# Patient Record
Sex: FEMALE | Race: WHITE | NOT HISPANIC OR LATINO | Employment: FULL TIME | URBAN - METROPOLITAN AREA
[De-identification: names, ages, dates, MRNs, and addresses within clinical notes are randomized per-mention and may not be internally consistent; named-entity substitution may affect disease eponyms.]

---

## 2023-04-22 PROBLEM — R53.83 FATIGUE: Status: ACTIVE | Noted: 2023-04-22

## 2023-04-22 PROBLEM — S39.012A LUMBAR STRAIN: Status: ACTIVE | Noted: 2023-04-22

## 2023-04-22 PROBLEM — E55.9 VITAMIN D DEFICIENCY: Status: ACTIVE | Noted: 2023-04-22

## 2023-04-22 PROBLEM — R10.9 ABDOMINAL PAIN: Status: RESOLVED | Noted: 2023-04-22 | Resolved: 2023-04-22

## 2023-04-22 PROBLEM — L73.9 FOLLICULITIS: Status: ACTIVE | Noted: 2023-04-22

## 2023-04-22 PROBLEM — A49.8 GARDNERELLA INFECTION: Status: ACTIVE | Noted: 2023-04-22

## 2023-04-22 PROBLEM — M54.50 LOW BACK PAIN: Status: ACTIVE | Noted: 2023-04-22

## 2023-04-22 PROBLEM — N89.8 VAGINAL DISCHARGE: Status: RESOLVED | Noted: 2023-04-22 | Resolved: 2023-04-22

## 2023-04-22 PROBLEM — N63.0 BREAST NODULE: Status: ACTIVE | Noted: 2023-04-22

## 2023-04-22 PROBLEM — R21 RASH: Status: RESOLVED | Noted: 2023-04-22 | Resolved: 2023-04-22

## 2023-04-22 PROBLEM — R10.2 PELVIC PAIN IN FEMALE: Status: ACTIVE | Noted: 2023-04-22

## 2023-04-22 PROBLEM — M43.00 PARS DEFECT: Status: ACTIVE | Noted: 2023-04-22

## 2023-05-26 ENCOUNTER — PATIENT OUTREACH (OUTPATIENT)
Dept: PRIMARY CARE | Facility: CLINIC | Age: 23
End: 2023-05-26
Payer: COMMERCIAL

## 2023-05-26 RX ORDER — IBUPROFEN 600 MG/1
600 TABLET ORAL EVERY 6 HOURS PRN
COMMUNITY
End: 2023-06-01 | Stop reason: ALTCHOICE

## 2023-05-26 RX ORDER — ACETAMINOPHEN 325 MG/1
TABLET ORAL EVERY 4 HOURS PRN
COMMUNITY
End: 2023-06-01 | Stop reason: ALTCHOICE

## 2023-05-26 NOTE — PROGRESS NOTES
Discharge Facility: [facility]  Discharge Diagnosis: Hypokalemia, Non-ST elevation myocardial infarction (NSTEMI), subendocardial infarction, initial episode of care, Tobacco abuse, amphetamine use, marijuana use  Admission Date: 5/25/2023  Discharge Date: 5/25/2023    PCP Appointment Date: 6/1/2023  Specialist Appointment Date: TBD  Hospital Encounter and Summary: Linked   See discharge assessment below for further details  Engagement  Call Start Time: 1534 (5/26/2023  3:38 PM)    Medications  Medications reviewed with patient/caregiver?: Yes (5/26/2023  3:38 PM)  Is the patient having any side effects they believe may be caused by any medication additions or changes?: No (5/26/2023  3:38 PM)  Does the patient have all medications ordered at discharge?: Yes (5/26/2023  3:38 PM)  Prescription Comments: see med list (5/26/2023  3:38 PM)  Is the patient taking all medications as directed (includes completed medication regime)?: Yes (5/26/2023  3:38 PM)    Appointments  Does the patient have a primary care provider?: Yes (5/26/2023  3:38 PM)  Care Management Interventions: Verified appointment date/time/provider (5/26/2023  3:38 PM)  Has the patient kept scheduled appointments due by today?: Yes (5/26/2023  3:38 PM)  Care Management Interventions: Advised patient to keep appointment (5/26/2023  3:38 PM)    Self Management  What is the home health agency?: denies need (5/26/2023  3:38 PM)    Patient Teaching  Does the patient have access to their discharge instructions?: Yes (5/26/2023  3:38 PM)  Care Management Interventions: Reviewed instructions with patient (5/26/2023  3:38 PM)  What is the patient's perception of their health status since discharge?: Improving (5/26/2023  3:38 PM)  Is the patient/caregiver able to teach back the hierarchy of who to call/visit for symptoms/problems? PCP, Specialist, Home Health nurse, Urgent Care, ED, 911: Yes (5/26/2023  3:38 PM)  If the patient is a current smoker,  are they able to teach back resources for cessation?: [phone] (5/26/2023  3:38 PM)

## 2023-06-01 ENCOUNTER — OFFICE VISIT (OUTPATIENT)
Dept: PRIMARY CARE | Facility: CLINIC | Age: 23
End: 2023-06-01
Payer: COMMERCIAL

## 2023-06-01 VITALS
TEMPERATURE: 97.6 F | OXYGEN SATURATION: 98 % | DIASTOLIC BLOOD PRESSURE: 82 MMHG | HEART RATE: 92 BPM | WEIGHT: 186.4 LBS | BODY MASS INDEX: 35.19 KG/M2 | HEIGHT: 61 IN | SYSTOLIC BLOOD PRESSURE: 110 MMHG

## 2023-06-01 DIAGNOSIS — M54.2 NECK PAIN: ICD-10-CM

## 2023-06-01 DIAGNOSIS — I47.10 SVT (SUPRAVENTRICULAR TACHYCARDIA) (CMS-HCC): ICD-10-CM

## 2023-06-01 DIAGNOSIS — F33.1 MODERATE EPISODE OF RECURRENT MAJOR DEPRESSIVE DISORDER (MULTI): Primary | ICD-10-CM

## 2023-06-01 DIAGNOSIS — R53.82 CHRONIC FATIGUE: ICD-10-CM

## 2023-06-01 DIAGNOSIS — E55.9 VITAMIN D DEFICIENCY: ICD-10-CM

## 2023-06-01 PROBLEM — M79.89 SOFT TISSUE MASS: Status: ACTIVE | Noted: 2019-08-26

## 2023-06-01 PROBLEM — B35.4 TINEA CORPORIS: Status: ACTIVE | Noted: 2020-06-05

## 2023-06-01 PROBLEM — L02.429 FURUNCLE OF AXILLA: Status: ACTIVE | Noted: 2019-08-26

## 2023-06-01 PROBLEM — B37.9 CANDIDIASIS: Status: ACTIVE | Noted: 2020-01-23

## 2023-06-01 PROBLEM — B37.2 CANDIDAL DERMATITIS: Status: ACTIVE | Noted: 2019-08-27

## 2023-06-01 PROCEDURE — 99215 OFFICE O/P EST HI 40 MIN: CPT | Performed by: FAMILY MEDICINE

## 2023-06-01 PROCEDURE — 1036F TOBACCO NON-USER: CPT | Performed by: FAMILY MEDICINE

## 2023-06-01 PROCEDURE — 3008F BODY MASS INDEX DOCD: CPT | Performed by: FAMILY MEDICINE

## 2023-06-01 NOTE — PROGRESS NOTES
"Patient: Rosa Rizo  : 2000  PCP: Preston Madrigal MD  MRN: [id number]  Program: No linked episodes     Rosa Rizo is a 22 y.o. female presenting today for follow-up after being discharged from the hospital 7 days ago. The main problem requiring admission was heart issues. The discharge summary and/or Transitional Care Management documentation was reviewed. Medication reconciliation was performed as indicated via the \"Daniel as Reviewed\" timestamp.     Rosa Rizo was contacted by Transitional Care Management services two days after her discharge. This encounter and supporting documentation was reviewed.    The complexity of medical decision making for this patient's transitional care is high.      Discharge Facility: [facility]  Discharge Diagnosis: Hypokalemia, Non-ST elevation myocardial infarction (NSTEMI), subendocardial infarction, initial episode of care, Tobacco abuse, amphetamine use, marijuana use  Admission Date: 2023  Discharge Date: 2023    PCP Appointment Date: 2023  Specialist Appointment Date: TBD  Hospital Encounter and Summary: Linked   See discharge assessment below for further details  Engagement  Call Start Time: 1534 (2023  3:38 PM)    Medications  Medications reviewed with patient/caregiver?: Yes (2023  3:38 PM)  Is the patient having any side effects they believe may be caused by any medication additions or changes?: No (2023  3:38 PM)  Does the patient have all medications ordered at discharge?: Yes (2023  3:38 PM)  Prescription Comments: see med list (2023  3:38 PM)  Is the patient taking all medications as directed (includes completed medication regime)?: Yes (2023  3:38 PM)    Appointments  Does the patient have a primary care provider?: Yes (2023  3:38 PM)  Care Management Interventions: Verified appointment date/time/provider (2023  3:38 PM)  Has the patient kept scheduled " appointments due by today?: Yes (5/26/2023  3:38 PM)  Care Management Interventions: Advised patient to keep appointment (5/26/2023  3:38 PM)    Self Management  What is the home health agency?: denies need (5/26/2023  3:38 PM)    Patient Teaching  Does the patient have access to their discharge instructions?: Yes (5/26/2023  3:38 PM)  Care Management Interventions: Reviewed instructions with patient (5/26/2023  3:38 PM)  What is the patient's perception of their health status since discharge?: Improving (5/26/2023  3:38 PM)  Is the patient/caregiver able to teach back the hierarchy of who to call/visit for symptoms/problems? PCP, Specialist, Home Health nurse, Urgent Care, ED, 911: Yes (5/26/2023  3:38 PM)  If the patient is a current smoker, are they able to teach back resources for cessation?: [phone] (5/26/2023  3:38 PM)        Has not felt right since getting out of hospital, feels like heart is racing.     Complains of right shoulder into neck pain, complains of aching. Pain travels into right arm.     HPI  Pain in the neck  No trauma or injury  She would like testing    Overdosed on meds she thought was ecstasy but might have been laced with cocaine or other drugs  Was in ER  Fortunately she survived and we discussed at length the dread complications of illicit drug use  Had SVT  Keep cardio evaluation    Low vitamin D  Replace    Chronic fatigue  Stable    Depression stable  No suicidal ideation no psychotic or manic symptoms  Recommend counseling    Consider substance abuse counseling and she declines at this time she states this is not a problem for her  Review of Systems   Constitutional:  Negative for activity change and fatigue.   HENT:  Negative for congestion and sore throat.    Eyes:  Negative for discharge.   Respiratory:  Negative for cough, chest tightness and shortness of breath.    Cardiovascular:  Negative for chest pain and leg swelling.   Gastrointestinal:  Negative for abdominal pain,  "blood in stool, constipation, diarrhea, nausea and vomiting.   Endocrine: Negative for cold intolerance and heat intolerance.   Genitourinary:  Negative for difficulty urinating and hematuria.   Musculoskeletal:  Negative for arthralgias, back pain, gait problem, myalgias and neck pain.   Allergic/Immunologic: Negative for environmental allergies.   Neurological:  Negative for dizziness, syncope, weakness, numbness and headaches.   Hematological:  Negative for adenopathy. Does not bruise/bleed easily.   Psychiatric/Behavioral:  Negative for dysphoric mood. The patient is not nervous/anxious.    All other systems reviewed and are negative.      Objective   /82 (BP Location: Right arm, BP Cuff Size: Large adult)   Pulse 92   Temp 36.4 °C (97.6 °F)   Ht 1.549 m (5' 1\")   Wt 84.6 kg (186 lb 6.4 oz)   SpO2 98%   BMI 35.22 kg/m²    Physical Exam  Vitals and nursing note reviewed.   Constitutional:       General: She is not in acute distress.     Appearance: Normal appearance.   HENT:      Head: Normocephalic and atraumatic.      Right Ear: Tympanic membrane, ear canal and external ear normal.      Left Ear: Tympanic membrane, ear canal and external ear normal.      Nose: Nose normal.      Mouth/Throat:      Mouth: Mucous membranes are moist.      Pharynx: Oropharynx is clear. No oropharyngeal exudate or posterior oropharyngeal erythema.   Eyes:      Extraocular Movements: Extraocular movements intact.      Conjunctiva/sclera: Conjunctivae normal.      Pupils: Pupils are equal, round, and reactive to light.   Cardiovascular:      Rate and Rhythm: Normal rate and regular rhythm.      Pulses: Normal pulses.      Heart sounds: Normal heart sounds. No murmur heard.  Pulmonary:      Effort: Pulmonary effort is normal. No respiratory distress.      Breath sounds: Normal breath sounds. No wheezing or rales.   Abdominal:      General: Abdomen is flat. Bowel sounds are normal. There is no distension.      Palpations: " Abdomen is soft. There is no mass.      Tenderness: There is no abdominal tenderness.   Musculoskeletal:         General: No swelling or deformity. Normal range of motion.      Cervical back: Normal range of motion and neck supple.      Right lower leg: No edema.      Left lower leg: No edema.   Lymphadenopathy:      Cervical: No cervical adenopathy.   Skin:     General: Skin is warm and dry.      Capillary Refill: Capillary refill takes less than 2 seconds.      Findings: No lesion or rash.   Neurological:      General: No focal deficit present.      Mental Status: She is alert and oriented to person, place, and time.      Cranial Nerves: No cranial nerve deficit.      Motor: No weakness.   Psychiatric:         Mood and Affect: Mood normal.         Behavior: Behavior normal.         Thought Content: Thought content normal.         Judgment: Judgment normal.         Assessment/Plan   Problem List Items Addressed This Visit          Circulatory    SVT (supraventricular tachycardia) (CMS/HCC)    Relevant Orders    Referral to Cardiology    Follow Up In [facility] - PCP       Endocrine/Metabolic    Vitamin D deficiency       Other    Fatigue    Moderate episode of recurrent major depressive disorder (CMS/HCC) - Primary     Other Visit Diagnoses       Neck pain        Relevant Orders    XR cervical spine complete 4-5 views            Patient education provided.  Stay current with age appropriate health maintenance as instructed.  Appointment here or ER with new or worsening symptoms'  Keep appropriate follow-up visit.  Stay current with proper immunizations   3-month recheck testing as above and stressed importance of avoiding illicit drug use or abuse of prescription drugs

## 2023-06-02 ASSESSMENT — ENCOUNTER SYMPTOMS
ARTHRALGIAS: 0
BACK PAIN: 0
MYALGIAS: 0
DIFFICULTY URINATING: 0
CONSTIPATION: 0
DIARRHEA: 0
VOMITING: 0
SORE THROAT: 0
NUMBNESS: 0
WEAKNESS: 0
ACTIVITY CHANGE: 0
NECK PAIN: 0
BLOOD IN STOOL: 0
FATIGUE: 0
NERVOUS/ANXIOUS: 0
NAUSEA: 0
ADENOPATHY: 0
EYE DISCHARGE: 0
HEADACHES: 0
SHORTNESS OF BREATH: 0
ABDOMINAL PAIN: 0
COUGH: 0
DYSPHORIC MOOD: 0
DIZZINESS: 0
HEMATURIA: 0
CHEST TIGHTNESS: 0
BRUISES/BLEEDS EASILY: 0

## 2023-06-08 ENCOUNTER — PATIENT OUTREACH (OUTPATIENT)
Dept: PRIMARY CARE | Facility: CLINIC | Age: 23
End: 2023-06-08
Payer: COMMERCIAL

## 2023-06-08 NOTE — PROGRESS NOTES
Unable to reach patient for call back after patient's follow up appointment with PCP.   LEIGHAM with call back number for patient to call if needed   If no voicemail available call attempts x 2 were made to contact the patient to assist with any questions or concerns patient may have.

## 2023-07-01 LAB
CHLAMYDIA TRACH., AMPLIFIED: NEGATIVE
CLUE CELLS: PRESENT
N. GONORRHEA, AMPLIFIED: NEGATIVE
NUGENT SCORE: 10
TRICHOMONAS VAGINALIS: NEGATIVE
YEAST: ABNORMAL

## 2023-12-21 ENCOUNTER — OFFICE VISIT (OUTPATIENT)
Dept: OBSTETRICS AND GYNECOLOGY | Facility: CLINIC | Age: 23
End: 2023-12-21
Payer: COMMERCIAL

## 2023-12-21 VITALS
SYSTOLIC BLOOD PRESSURE: 122 MMHG | BODY MASS INDEX: 34.17 KG/M2 | HEIGHT: 61 IN | WEIGHT: 181 LBS | DIASTOLIC BLOOD PRESSURE: 62 MMHG

## 2023-12-21 DIAGNOSIS — Z01.419 WELL WOMAN EXAM WITH ROUTINE GYNECOLOGICAL EXAM: Primary | ICD-10-CM

## 2023-12-21 DIAGNOSIS — Z78.9 USES HORMONAL CONTRACEPTIVE PATCH AS PRIMARY BIRTH CONTROL METHOD: ICD-10-CM

## 2023-12-21 DIAGNOSIS — Z20.2 STD EXPOSURE: ICD-10-CM

## 2023-12-21 DIAGNOSIS — R10.2 PELVIC PAIN: ICD-10-CM

## 2023-12-21 DIAGNOSIS — R39.198 DIFFICULTY VOIDING: ICD-10-CM

## 2023-12-21 PROCEDURE — 87624 HPV HI-RISK TYP POOLED RSLT: CPT

## 2023-12-21 PROCEDURE — 1036F TOBACCO NON-USER: CPT | Performed by: ADVANCED PRACTICE MIDWIFE

## 2023-12-21 PROCEDURE — 88141 CYTOPATH C/V INTERPRET: CPT | Performed by: PATHOLOGY

## 2023-12-21 PROCEDURE — 87086 URINE CULTURE/COLONY COUNT: CPT

## 2023-12-21 PROCEDURE — 87661 TRICHOMONAS VAGINALIS AMPLIF: CPT

## 2023-12-21 PROCEDURE — 99395 PREV VISIT EST AGE 18-39: CPT | Performed by: ADVANCED PRACTICE MIDWIFE

## 2023-12-21 PROCEDURE — 3008F BODY MASS INDEX DOCD: CPT | Performed by: ADVANCED PRACTICE MIDWIFE

## 2023-12-21 PROCEDURE — 99212 OFFICE O/P EST SF 10 MIN: CPT | Performed by: ADVANCED PRACTICE MIDWIFE

## 2023-12-21 PROCEDURE — 88175 CYTOPATH C/V AUTO FLUID REDO: CPT

## 2023-12-21 PROCEDURE — 87800 DETECT AGNT MULT DNA DIREC: CPT

## 2023-12-21 RX ORDER — NORELGESTROMIN AND ETHINYL ESTRADIOL 35; 150 UG/MG; UG/MG
1 PATCH TRANSDERMAL
Qty: 3 PATCH | Refills: 12 | Status: SHIPPED | OUTPATIENT
Start: 2023-12-21

## 2023-12-21 NOTE — PROGRESS NOTES
"GYNECOLOGY PROGRESS NOTE      CC:  1st pap today today. Patient is sexually active and wants STI cultures sent. Some discharge not no itching or odor. Patient used pills in the past and would forget and depo caused her weight gain. Patient would like to start Xulane patches. Patient also has a hx of ovarian cysts and has pain on and off at times.  Chief Complaint   Patient presents with    Annual Exam     First PAP today        HPI:  Rosa Rizo is here for a routine GYN examination.  No GYN c/o, no AUB.      Contraception:  wants Xulane patches  Pregnancy plans:  denies  Gardasil:  no    Depression screen:  negative      ROS:  GI - no blood in BMs  URO - no hematuria  GYN - no AUB or vaginal discharge  PSYCH - mood OK        PHYSICAL EXAM:  /62 (BP Location: Right arm, Patient Position: Sitting, BP Cuff Size: Adult)   Ht 1.549 m (5' 1\")   Wt 82.1 kg (181 lb)   LMP 12/13/2023   Breastfeeding No   BMI 34.20 kg/m²   GEN:  A&O, NAD  ABD:  NT/ND, soft, no palpable masses  URO:  normal urethra, no bladder TTP  GYN:  normal vulva and perineum w/o lesions or ulcers, normal vagina without discharge or lesions, normal cervix without lesions or discharge or CMT, uterus NT/NE, adnexa mobile and NT/NE  BREAST:  no masses or TTP, no skin lesions or nipple discharge  PSYCH:  normal affect, non-anxious      IMPRESSION/PLAN:    A: Normal gyn exam. Pelvic pain on and off. Possible STI exposure. No contraindications to Xulane patch use.  Plan: 1. Pap. 2. STI off the urine. 3. Vaginal cx sent. 4. Pelvic sonogram ordered due to hx of ovarian cysts. 5. Rx sent in for Xulane patches. Start patches this Sunday.    A: scant discharge. Possible STI exposure. Tenderness over pubic area.   Problem List Items Addressed This Visit    None  Visit Diagnoses       Well woman exam with routine gynecological exam                     SHAHANA Barnett      "

## 2023-12-22 ENCOUNTER — HOSPITAL ENCOUNTER (OUTPATIENT)
Dept: RADIOLOGY | Facility: HOSPITAL | Age: 23
Discharge: HOME | End: 2023-12-22
Payer: COMMERCIAL

## 2023-12-22 DIAGNOSIS — R10.2 PELVIC PAIN: ICD-10-CM

## 2023-12-22 DIAGNOSIS — A74.9 CHLAMYDIA: Primary | ICD-10-CM

## 2023-12-22 LAB
C TRACH RRNA SPEC QL NAA+PROBE: POSITIVE
N GONORRHOEA DNA SPEC QL PROBE+SIG AMP: NEGATIVE
T VAGINALIS RRNA SPEC QL NAA+PROBE: NEGATIVE

## 2023-12-22 PROCEDURE — 76856 US EXAM PELVIC COMPLETE: CPT

## 2023-12-22 PROCEDURE — 76856 US EXAM PELVIC COMPLETE: CPT | Performed by: RADIOLOGY

## 2023-12-22 PROCEDURE — 76830 TRANSVAGINAL US NON-OB: CPT | Performed by: RADIOLOGY

## 2023-12-22 RX ORDER — AZITHROMYCIN 500 MG/1
1000 TABLET, FILM COATED ORAL ONCE
Qty: 2 TABLET | Refills: 1 | Status: SHIPPED | OUTPATIENT
Start: 2023-12-22 | End: 2023-12-22

## 2023-12-23 LAB — BACTERIA UR CULT: NORMAL

## 2023-12-26 ENCOUNTER — TELEPHONE (OUTPATIENT)
Dept: OBSTETRICS AND GYNECOLOGY | Facility: CLINIC | Age: 23
End: 2023-12-26
Payer: COMMERCIAL

## 2023-12-26 DIAGNOSIS — A74.9 CHLAMYDIA: Primary | ICD-10-CM

## 2023-12-26 RX ORDER — DOXYCYCLINE 100 MG/1
100 CAPSULE ORAL 2 TIMES DAILY
Qty: 14 CAPSULE | Refills: 0 | Status: SHIPPED | OUTPATIENT
Start: 2023-12-26 | End: 2024-01-02

## 2023-12-26 NOTE — TELEPHONE ENCOUNTER
----- Message from SHAHANA Nelson sent at 12/26/2023 10:58 AM EST -----  +Chlamydia  Prescription sent  Thank you  ----- Message -----  From: Lab, Background User  Sent: 12/22/2023  10:50 AM EST  To: SHAHANA Barnett

## 2024-01-15 LAB
CYTOLOGY CMNT CVX/VAG CYTO-IMP: NORMAL
HPV HR GENOTYPES PNL CVX NAA+PROBE: NEGATIVE
LAB AP HPV GENOTYPE QUESTION: NO
LAB AP HPV HR: NORMAL
LABORATORY COMMENT REPORT: NORMAL
LMP START DATE: NORMAL
PATH REPORT.TOTAL CANCER: NORMAL

## 2024-01-17 DIAGNOSIS — A74.9 CHLAMYDIA INFECTION: Primary | ICD-10-CM

## 2024-01-17 RX ORDER — AZITHROMYCIN 500 MG/1
1000 TABLET, FILM COATED ORAL ONCE
Qty: 2 TABLET | Refills: 1 | Status: SHIPPED | OUTPATIENT
Start: 2024-01-17 | End: 2024-01-17

## 2024-05-21 ENCOUNTER — HOSPITAL ENCOUNTER (EMERGENCY)
Facility: HOSPITAL | Age: 24
Discharge: LEFT BEFORE DISCHARGE | End: 2024-05-21
Payer: COMMERCIAL

## 2024-05-21 PROCEDURE — 4500999001 HC ED NO CHARGE

## 2024-05-23 ENCOUNTER — HOSPITAL ENCOUNTER (EMERGENCY)
Facility: HOSPITAL | Age: 24
Discharge: HOME | End: 2024-05-23
Payer: COMMERCIAL

## 2024-05-23 VITALS
OXYGEN SATURATION: 98 % | DIASTOLIC BLOOD PRESSURE: 61 MMHG | HEART RATE: 84 BPM | WEIGHT: 175 LBS | SYSTOLIC BLOOD PRESSURE: 120 MMHG | RESPIRATION RATE: 18 BRPM | HEIGHT: 61 IN | BODY MASS INDEX: 33.04 KG/M2 | TEMPERATURE: 97.9 F

## 2024-05-23 PROCEDURE — 4500999001 HC ED NO CHARGE

## 2024-05-23 ASSESSMENT — COLUMBIA-SUICIDE SEVERITY RATING SCALE - C-SSRS
6. HAVE YOU EVER DONE ANYTHING, STARTED TO DO ANYTHING, OR PREPARED TO DO ANYTHING TO END YOUR LIFE?: NO
1. IN THE PAST MONTH, HAVE YOU WISHED YOU WERE DEAD OR WISHED YOU COULD GO TO SLEEP AND NOT WAKE UP?: NO
2. HAVE YOU ACTUALLY HAD ANY THOUGHTS OF KILLING YOURSELF?: NO

## 2024-05-23 ASSESSMENT — PAIN DESCRIPTION - PAIN TYPE: TYPE: ACUTE PAIN

## 2024-05-23 ASSESSMENT — PAIN - FUNCTIONAL ASSESSMENT: PAIN_FUNCTIONAL_ASSESSMENT: 0-10

## 2024-05-23 ASSESSMENT — LIFESTYLE VARIABLES
TOTAL SCORE: 0
EVER FELT BAD OR GUILTY ABOUT YOUR DRINKING: NO
EVER HAD A DRINK FIRST THING IN THE MORNING TO STEADY YOUR NERVES TO GET RID OF A HANGOVER: NO
HAVE PEOPLE ANNOYED YOU BY CRITICIZING YOUR DRINKING: NO
HAVE YOU EVER FELT YOU SHOULD CUT DOWN ON YOUR DRINKING: NO

## 2024-05-23 ASSESSMENT — PAIN DESCRIPTION - DESCRIPTORS: DESCRIPTORS: TIGHTNESS

## 2024-05-23 ASSESSMENT — PAIN DESCRIPTION - ORIENTATION: ORIENTATION: RIGHT

## 2024-05-23 ASSESSMENT — PAIN DESCRIPTION - LOCATION: LOCATION: NECK

## 2024-05-23 ASSESSMENT — PAIN DESCRIPTION - FREQUENCY: FREQUENCY: INTERMITTENT

## 2024-05-23 ASSESSMENT — PAIN SCALES - GENERAL: PAINLEVEL_OUTOF10: 5 - MODERATE PAIN

## 2024-05-23 ASSESSMENT — PAIN DESCRIPTION - PROGRESSION: CLINICAL_PROGRESSION: NOT CHANGED

## 2024-05-23 ASSESSMENT — PAIN DESCRIPTION - ONSET: ONSET: AWAKENED FROM SLEEP

## 2024-06-03 ENCOUNTER — APPOINTMENT (OUTPATIENT)
Dept: RADIOLOGY | Facility: HOSPITAL | Age: 24
End: 2024-06-03
Payer: COMMERCIAL

## 2024-06-03 ENCOUNTER — HOSPITAL ENCOUNTER (EMERGENCY)
Facility: HOSPITAL | Age: 24
Discharge: HOME | End: 2024-06-03
Attending: EMERGENCY MEDICINE
Payer: COMMERCIAL

## 2024-06-03 VITALS
SYSTOLIC BLOOD PRESSURE: 103 MMHG | HEIGHT: 61 IN | TEMPERATURE: 96.8 F | HEART RATE: 80 BPM | OXYGEN SATURATION: 99 % | RESPIRATION RATE: 16 BRPM | WEIGHT: 175 LBS | DIASTOLIC BLOOD PRESSURE: 74 MMHG | BODY MASS INDEX: 33.04 KG/M2

## 2024-06-03 DIAGNOSIS — O20.0 THREATENED ABORTION (HHS-HCC): Primary | ICD-10-CM

## 2024-06-03 LAB
ABO GROUP (TYPE) IN BLOOD: NORMAL
ALBUMIN SERPL BCP-MCNC: 4.3 G/DL (ref 3.4–5)
ALP SERPL-CCNC: 36 U/L (ref 33–110)
ALT SERPL W P-5'-P-CCNC: 9 U/L (ref 7–45)
ANION GAP SERPL CALC-SCNC: 11 MMOL/L (ref 10–20)
ANTIBODY SCREEN: NORMAL
APTT PPP: 30 SECONDS (ref 27–38)
AST SERPL W P-5'-P-CCNC: 15 U/L (ref 9–39)
B-HCG SERPL-ACNC: ABNORMAL MIU/ML
BASOPHILS # BLD AUTO: 0.02 X10*3/UL (ref 0–0.1)
BASOPHILS NFR BLD AUTO: 0.3 %
BILIRUB SERPL-MCNC: 0.4 MG/DL (ref 0–1.2)
BUN SERPL-MCNC: 11 MG/DL (ref 6–23)
CALCIUM SERPL-MCNC: 9.1 MG/DL (ref 8.6–10.3)
CHLORIDE SERPL-SCNC: 107 MMOL/L (ref 98–107)
CO2 SERPL-SCNC: 24 MMOL/L (ref 21–32)
CREAT SERPL-MCNC: 0.62 MG/DL (ref 0.5–1.05)
EGFRCR SERPLBLD CKD-EPI 2021: >90 ML/MIN/1.73M*2
EOSINOPHIL # BLD AUTO: 0.01 X10*3/UL (ref 0–0.7)
EOSINOPHIL NFR BLD AUTO: 0.2 %
ERYTHROCYTE [DISTWIDTH] IN BLOOD BY AUTOMATED COUNT: 12.5 % (ref 11.5–14.5)
GLUCOSE SERPL-MCNC: 84 MG/DL (ref 74–99)
HCT VFR BLD AUTO: 34.6 % (ref 36–46)
HGB BLD-MCNC: 12.2 G/DL (ref 12–16)
IMM GRANULOCYTES # BLD AUTO: 0.02 X10*3/UL (ref 0–0.7)
IMM GRANULOCYTES NFR BLD AUTO: 0.3 % (ref 0–0.9)
INR PPP: 1.1 (ref 0.9–1.1)
LYMPHOCYTES # BLD AUTO: 1.32 X10*3/UL (ref 1.2–4.8)
LYMPHOCYTES NFR BLD AUTO: 20.1 %
MCH RBC QN AUTO: 33.9 PG (ref 26–34)
MCHC RBC AUTO-ENTMCNC: 35.3 G/DL (ref 32–36)
MCV RBC AUTO: 96 FL (ref 80–100)
MONOCYTES # BLD AUTO: 0.45 X10*3/UL (ref 0.1–1)
MONOCYTES NFR BLD AUTO: 6.8 %
NEUTROPHILS # BLD AUTO: 4.76 X10*3/UL (ref 1.2–7.7)
NEUTROPHILS NFR BLD AUTO: 72.3 %
NRBC BLD-RTO: 0 /100 WBCS (ref 0–0)
PLATELET # BLD AUTO: 254 X10*3/UL (ref 150–450)
POTASSIUM SERPL-SCNC: 3.8 MMOL/L (ref 3.5–5.3)
PROT SERPL-MCNC: 6.9 G/DL (ref 6.4–8.2)
PROTHROMBIN TIME: 12.7 SECONDS (ref 9.8–12.8)
RBC # BLD AUTO: 3.6 X10*6/UL (ref 4–5.2)
RH FACTOR (ANTIGEN D): NORMAL
SODIUM SERPL-SCNC: 138 MMOL/L (ref 136–145)
WBC # BLD AUTO: 6.6 X10*3/UL (ref 4.4–11.3)

## 2024-06-03 PROCEDURE — 2500000004 HC RX 250 GENERAL PHARMACY W/ HCPCS (ALT 636 FOR OP/ED): Performed by: EMERGENCY MEDICINE

## 2024-06-03 PROCEDURE — 85730 THROMBOPLASTIN TIME PARTIAL: CPT | Performed by: EMERGENCY MEDICINE

## 2024-06-03 PROCEDURE — 84702 CHORIONIC GONADOTROPIN TEST: CPT | Performed by: EMERGENCY MEDICINE

## 2024-06-03 PROCEDURE — 76801 OB US < 14 WKS SINGLE FETUS: CPT | Performed by: RADIOLOGY

## 2024-06-03 PROCEDURE — 76801 OB US < 14 WKS SINGLE FETUS: CPT

## 2024-06-03 PROCEDURE — 86901 BLOOD TYPING SEROLOGIC RH(D): CPT | Performed by: EMERGENCY MEDICINE

## 2024-06-03 PROCEDURE — 80053 COMPREHEN METABOLIC PANEL: CPT | Performed by: EMERGENCY MEDICINE

## 2024-06-03 PROCEDURE — 85025 COMPLETE CBC W/AUTO DIFF WBC: CPT | Performed by: EMERGENCY MEDICINE

## 2024-06-03 PROCEDURE — 99284 EMERGENCY DEPT VISIT MOD MDM: CPT | Mod: 25

## 2024-06-03 PROCEDURE — 96360 HYDRATION IV INFUSION INIT: CPT

## 2024-06-03 PROCEDURE — 85610 PROTHROMBIN TIME: CPT | Performed by: EMERGENCY MEDICINE

## 2024-06-03 PROCEDURE — 36415 COLL VENOUS BLD VENIPUNCTURE: CPT | Performed by: EMERGENCY MEDICINE

## 2024-06-03 RX ADMIN — SODIUM CHLORIDE 1000 ML: 9 INJECTION, SOLUTION INTRAVENOUS at 13:10

## 2024-06-03 ASSESSMENT — PAIN - FUNCTIONAL ASSESSMENT: PAIN_FUNCTIONAL_ASSESSMENT: 0-10

## 2024-06-03 ASSESSMENT — COLUMBIA-SUICIDE SEVERITY RATING SCALE - C-SSRS
1. IN THE PAST MONTH, HAVE YOU WISHED YOU WERE DEAD OR WISHED YOU COULD GO TO SLEEP AND NOT WAKE UP?: NO
2. HAVE YOU ACTUALLY HAD ANY THOUGHTS OF KILLING YOURSELF?: NO
6. HAVE YOU EVER DONE ANYTHING, STARTED TO DO ANYTHING, OR PREPARED TO DO ANYTHING TO END YOUR LIFE?: NO

## 2024-06-03 ASSESSMENT — LIFESTYLE VARIABLES
TOTAL SCORE: 0
HAVE PEOPLE ANNOYED YOU BY CRITICIZING YOUR DRINKING: NO
EVER HAD A DRINK FIRST THING IN THE MORNING TO STEADY YOUR NERVES TO GET RID OF A HANGOVER: NO
EVER FELT BAD OR GUILTY ABOUT YOUR DRINKING: NO
HAVE YOU EVER FELT YOU SHOULD CUT DOWN ON YOUR DRINKING: NO

## 2024-06-03 ASSESSMENT — PAIN SCALES - GENERAL: PAINLEVEL_OUTOF10: 5 - MODERATE PAIN

## 2024-06-03 NOTE — ED PROVIDER NOTES
"HPI   Chief Complaint   Patient presents with    Pregnancy Problem     'I think I might be having a miscarriage.  Had positive pregnancy test 3 weeks ago.\"  \"Having stomach pain too besides the bleeding.\"         History provided by:  Patient  History of Present Illness:  23-year-old female presents with vaginal bleeding and lower abdominal cramping.  This started last night.  Patient states that she took a positive home pregnancy test about 3 weeks ago.  Her last normal period ended at the end of April.  No fever or chills.  No vaginal discharge.  No back or flank pain.  No dysuria or hematuria.  No nausea or vomiting.  No diarrhea.  Nothing seems to make her symptoms worse or better.  No trauma.      PMFSH:   As per HPI, otherwise nurses notes reviewed in EMR.    Past Medical History:   Active Ambulatory Problems     Diagnosis Date Noted    Breast nodule 04/22/2023    Fatigue 04/22/2023    Folliculitis 04/22/2023    Gardnerella infection 04/22/2023    Low back pain 04/22/2023    Lumbar strain 04/22/2023    Pars defect 04/22/2023    Pelvic pain in female 04/22/2023    Vitamin D deficiency 04/22/2023    Candidal dermatitis 08/27/2019    Candidiasis 01/23/2020    Furuncle of axilla 08/26/2019    Intrinsic eczema 11/22/2016    Moderate episode of recurrent major depressive disorder (Multi) 06/05/2020    Non morbid obesity due to excess calories 11/22/2016    Pruritic condition 11/22/2016    Soft tissue mass 08/26/2019    Tinea corporis 06/05/2020    SVT (supraventricular tachycardia) (CMS-[facility]) 06/01/2023     Resolved Ambulatory Problems     Diagnosis Date Noted    Abdominal pain 04/22/2023    Vaginal discharge 04/22/2023    Rash 04/22/2023     No Additional Past Medical History      Past Surgical History:   Past Surgical History:   Procedure Laterality Date    OTHER SURGICAL HISTORY  06/25/2020    Tooth extraction      Family History:   Family History   Problem Relation Name Age of Onset    Epilepsy Mother      " Cervical cancer Mother        Social History:    Social History     Socioeconomic History    Marital status: Single     Spouse name: Not on file    Number of children: Not on file    Years of education: Not on file    Highest education level: Not on file   Occupational History    Not on file   Tobacco Use    Smoking status: Former     Types: Cigarettes     Passive exposure: Never    Smokeless tobacco: Never   Vaping Use    Vaping status: Every Day    Substances: Nicotine   Substance and Sexual Activity    Alcohol use: Yes    Drug use: Yes     Types: Marijuana    Sexual activity: Yes     Partners: Male     Birth control/protection: None   Other Topics Concern    Not on file   Social History Narrative    Not on file     Social Determinants of Health     Financial Resource Strain: Not on file   Food Insecurity: Not on file   Transportation Needs: Not on file   Physical Activity: Not on file   Stress: Not on file   Social Connections: Not on file   Intimate Partner Violence: Not on file   Housing Stability: Not on file                          [place] Coma Scale Score: 15                     Patient History   Past Medical History:   Diagnosis Date    Vaginal discharge 04/22/2023     Past Surgical History:   Procedure Laterality Date    OTHER SURGICAL HISTORY  06/25/2020    Tooth extraction     Family History   Problem Relation Name Age of Onset    Epilepsy Mother      Cervical cancer Mother       Social History     Tobacco Use    Smoking status: Former     Types: Cigarettes     Passive exposure: Never    Smokeless tobacco: Never   Vaping Use    Vaping status: Every Day    Substances: Nicotine   Substance Use Topics    Alcohol use: Yes    Drug use: Yes     Types: Marijuana       Physical Exam   ED Triage Vitals [06/03/24 1247]   Temperature Heart Rate Respirations BP   36 °C (96.8 °F) 75 18 111/57      Pulse Ox Temp Source Heart Rate Source Patient Position   96 % Temporal Monitor Sitting      BP Location FiO2 (%)      Right arm --       Physical Exam  Physical Exam:    ED Triage Vitals [24 1247]   Temperature Heart Rate Respirations BP   36 °C (96.8 °F) 75 18 111/57      Pulse Ox Temp Source Heart Rate Source Patient Position   96 % Temporal Monitor Sitting      BP Location FiO2 (%)     Right arm --         Constitutional: Vital signs per nursing notes.  Well developed, well nourished.  No acute distress.    Psychiatric: alert and oriented to person, place, and time; no abnormalities of mood or affect; memory intact  Eyes: PERRL; conjunctivae and lids normal; EOMI  Respiratory: normal respiratory effort and excursion; no rales, rhonchi, or wheezes; equal air entry  Cardiovascular: regular rate and rhythm; no murmurs, rubs or gallops; symmetric pulses; no edema; normal capillary refill; distal pulses present  Neurological: normal speech; CN II-XII grossly intact; normal motor and sensory function; no nystagmus  GI: no masses, tenderness, rebound or guarding; no palpable, pulsatile mass; no organomegaly; no hernia; normal bowel sounds; (-) Mike´s sign; (-) McBurney´s sign; (-) CVA tenderness  Lymphatic: no adenopathy of neck, groin  Musculoskeletal: normal gait and station; normal digits and nails; no gross tendon or ligament injury; normal to palpation; normal strength/tone; neurovascular status intact; (-) Riana´s sign; (-) straight leg raise  Skin: normal to inspection; normal to palpation; no rash  GCS: 15    ED Course & MDM   Diagnoses as of 24 1613   Threatened  ([facility])       Medical Decision Making  Medical Decision Making:    Differential Diagnoses Considered: Threatened , miscarriage, ectopic, early pregnancy bleeding     EKG:    Labs Reviewed   HUMAN CHORIONIC GONADOTROPIN, SERUM QUANTITATIVE - Abnormal       Result Value    HCG, Beta-Quantitative 28,719 (*)     Narrative:      Total HCG measurement is performed using the [employer] Access   Immunoassay which detects intact HCG and  free beta HCG subunit.    This test is not indicated for use as a tumor marker.   HCG testing is performed using a different test methodology at [facility] than other [facility]. Direct result comparison   should only be made within the same method.       CBC WITH AUTO DIFFERENTIAL - Abnormal    WBC 6.6      nRBC 0.0      RBC 3.60 (*)     Hemoglobin 12.2      Hematocrit 34.6 (*)     MCV 96      MCH 33.9      MCHC 35.3      RDW 12.5      Platelets 254      Neutrophils % 72.3      Immature Granulocytes %, Automated 0.3      Lymphocytes % 20.1      Monocytes % 6.8      Eosinophils % 0.2      Basophils % 0.3      Neutrophils Absolute 4.76      Immature Granulocytes Absolute, Automated 0.02      Lymphocytes Absolute 1.32      Monocytes Absolute 0.45      Eosinophils Absolute 0.01      Basophils Absolute 0.02     COMPREHENSIVE METABOLIC PANEL - Normal    Glucose 84      Sodium 138      Potassium 3.8      Chloride 107      Bicarbonate 24      Anion Gap 11      Urea Nitrogen 11      Creatinine 0.62      eGFR >90      Calcium 9.1      Albumin 4.3      Alkaline Phosphatase 36      Total Protein 6.9      AST 15      Bilirubin, Total 0.4      ALT 9     APTT - Normal    aPTT 30      Narrative:     The APTT is no longer used for monitoring Unfractionated Heparin Therapy. For monitoring Heparin Therapy, use the Heparin Assay.   PROTIME-INR - Normal    Protime 12.7      INR 1.1     TYPE AND SCREEN    ABO TYPE O      Rh TYPE POS      ANTIBODY SCREEN NEG         US PELVIS OB TRANSABDOMINAL W TRANSVAGINAL UP TO 1ST TRIMESTER   Final Result   Single intrauterine gestational sac is demonstrated with mean sac   diameter corresponding to gestational age of 6 weeks and 5 days.   However, no yolk sac or fetal pole is identified. This could   represent early IUP, too early to detect yolk sac or fetal pole but   the appearance is most  consistent with blighted ovum/ anembryonic   pregnancy.        Signed by: Mariana Marques  6/3/2024 4:05 PM   Dictation workstation:   [id number]          Diagnostic testing considered:         Review of recent and relevant records:    ED Medication Administration:   ED Medication Administration from 06/03/2024 1240 to 06/03/2024 1613         Date/Time Order Dose Route Action Action by     06/03/2024 1310 EDT sodium chloride 0.9 % bolus 1,000 mL 1,000 mL intravenous New Bag MARIA ALEJANDRA Higgins            Prescription Medication Consideration/Given:     Social Determinants of Health Significantly Affecting Care:      Summary:    BP 98/54 (06/03/24 1533)    Temp      Pulse 61 (06/03/24 1533)   Resp 18 (06/03/24 1533)    SpO2 100 % (06/03/24 1533)      Abnormal Labs Reviewed   HUMAN CHORIONIC GONADOTROPIN, SERUM QUANTITATIVE - Abnormal; Notable for the following components:       Result Value    HCG, Beta-Quantitative 28,719 (*)     All other components within normal limits    Narrative:      Total HCG measurement is performed using the [employer] Access   Immunoassay which detects intact HCG and free beta HCG subunit.    This test is not indicated for use as a tumor marker.   HCG testing is performed using a different test methodology at [facility] than other [facility]. Direct result comparison   should only be made within the same method.       CBC WITH AUTO DIFFERENTIAL - Abnormal; Notable for the following components:    RBC 3.60 (*)     Hematocrit 34.6 (*)     All other components within normal limits     Diagnostic evaluation was completed.  Patient is O+.  Quantitative hCG was 28,719.  Metabolic panel shows a normal glucose at 84.  Sodium potassium in the normal range.  Renal and liver function are in the normal range.  INR is 1.1.  CBC shows a normal white blood cell count of 6.6.  There is no evidence of anemia with a hemoglobin of 12.2.  Ultrasound of the pelvis shows a single intrauterine gestational sac with mean sac diameter corresponding to gestational age of 6 weeks and 5  days.  However no yolk sac or fetal pole is identified.  This could represent early IUP, too early to detect yolk sac or fetal pole but the appearance is most consistent with blighted ovum and embryonic pregnancy.    The patient will be discharged home with short-term follow-up with OB.  I believe this likely represents threatened .    I discussed the results and discharge plan with the patient and/or family/friend if present.  I emphasized the importance of follow up with the physician I referred them to in the timeframe recommended.  I explained reasons for the patient to return to the Emergency Department.  Questions were addressed.  The patient and/or family/friend expressed understanding.        Diagnoses as of 24 1613   Threatened  ([facility])         Procedure  Procedures     Jonnathan HONG MD  24 1612

## 2024-06-14 ENCOUNTER — LAB (OUTPATIENT)
Dept: LAB | Facility: LAB | Age: 24
End: 2024-06-14
Payer: COMMERCIAL

## 2024-06-14 ENCOUNTER — APPOINTMENT (OUTPATIENT)
Dept: OBSTETRICS AND GYNECOLOGY | Facility: CLINIC | Age: 24
End: 2024-06-14
Payer: COMMERCIAL

## 2024-06-14 VITALS — DIASTOLIC BLOOD PRESSURE: 58 MMHG | BODY MASS INDEX: 34.62 KG/M2 | SYSTOLIC BLOOD PRESSURE: 112 MMHG | WEIGHT: 183.2 LBS

## 2024-06-14 DIAGNOSIS — Z32.01 PREGNANCY TEST POSITIVE (HHS-HCC): Primary | ICD-10-CM

## 2024-06-14 DIAGNOSIS — O20.9 BLEEDING IN EARLY PREGNANCY (HHS-HCC): ICD-10-CM

## 2024-06-14 LAB
B-HCG SERPL-ACNC: ABNORMAL MIU/ML
PREGNANCY TEST URINE, POC: POSITIVE

## 2024-06-14 PROCEDURE — 36415 COLL VENOUS BLD VENIPUNCTURE: CPT

## 2024-06-14 PROCEDURE — 84702 CHORIONIC GONADOTROPIN TEST: CPT

## 2024-06-14 PROCEDURE — 99214 OFFICE O/P EST MOD 30 MIN: CPT | Performed by: ADVANCED PRACTICE MIDWIFE

## 2024-06-14 PROCEDURE — 81025 URINE PREGNANCY TEST: CPT | Performed by: ADVANCED PRACTICE MIDWIFE

## 2024-06-14 ASSESSMENT — EDINBURGH POSTNATAL DEPRESSION SCALE (EPDS)
TOTAL SCORE: 15
I HAVE BEEN SO UNHAPPY THAT I HAVE BEEN CRYING: YES, QUITE OFTEN
I HAVE FELT SAD OR MISERABLE: YES, QUITE OFTEN
I HAVE FELT SCARED OR PANICKY FOR NO GOOD REASON: YES, QUITE A LOT
THE THOUGHT OF HARMING MYSELF HAS OCCURRED TO ME: HARDLY EVER
THINGS HAVE BEEN GETTING ON TOP OF ME: NO, MOST OF THE TIME I HAVE COPED QUITE WELL
I HAVE LOOKED FORWARD WITH ENJOYMENT TO THINGS: AS MUCH AS I EVER DID
I HAVE BEEN SO UNHAPPY THAT I HAVE HAD DIFFICULTY SLEEPING: YES, SOMETIMES
I HAVE BEEN ANXIOUS OR WORRIED FOR NO GOOD REASON: YES, VERY OFTEN
I HAVE BEEN ABLE TO LAUGH AND SEE THE FUNNY SIDE OF THINGS: AS MUCH AS I ALWAYS COULD
I HAVE BLAMED MYSELF UNNECESSARILY WHEN THINGS WENT WRONG: NOT VERY OFTEN

## 2024-06-14 NOTE — PROGRESS NOTES
Assessment/Plan   - Discussed LMP and ALIDA by this date.   - Discussed Hcg and US results from ER visit. Discussed Hcg value at 6.5 weeks and value where a fetal pole is generally seen.   - Discussed potential pregnancy outcomes including possible blighted ovum/anembryonic pregnancy.   - Discussed repeat quant Hcg, to be done today, as well as expected rise in value. Order placed.   - Discussed Mac imaging US, scheduling, locations. Scheduled for .   - Reviewed warning signs that would necessitate going back to the ER.   - All questions answered.   Will follow up based on US results.     Maryjane Back, DONALD-NAYLA    Subjective   Rosa Shauna Rizo is a 23 y.o. female who initially presents for new OB visit though reports an episode of bleeding and cramping and was seen in ER.      HPI    Pt initially scheduled for new OB visit, changed to ER follow-up given results from ER visit.   LMP: . Is sure of date (tracks it in an [place]) and reports it was a normal menses. Does report history of irregular menses.   On , reports noting bright red bleeding on toilet tissue when wiping. Bleeding resolved the same day. Denies any heavy bleeding.   Has also had period-like cramping present daily since learning of pregnancy. Has not taken medication for relief.   Seen in ER on 6/3 due to bleeding and cramping. Quant Hcg 28,719. US- [facility], no yolk sac or fetal pole, 6.5 weeks.   Pt denies any bleeding or spotting since ER visit, though does continue to report the period-like cramping.     Menstrual History:  OB History          1    Para   0    Term   0       0    AB   0    Living   0         SAB   0    IAB   0    Ectopic   0    Multiple   0    Live Births   0                Patient's last menstrual period was 2024.       Objective   /58   Wt 83.1 kg (183 lb 3.2 oz)   LMP 2024   BMI 34.62 kg/m²     Physical Exam  Constitutional:       Appearance: Normal appearance.   HENT:      Head:  Normocephalic.   Pulmonary:      Effort: Pulmonary effort is normal.   Musculoskeletal:         General: Normal range of motion.   Neurological:      General: No focal deficit present.      Mental Status: She is alert and oriented to person, place, and time.   Psychiatric:         Mood and Affect: Mood normal.         Behavior: Behavior normal.         Thought Content: Thought content normal.         Judgment: Judgment normal.   Vitals and nursing note reviewed.

## 2024-06-17 ENCOUNTER — HOSPITAL ENCOUNTER (OUTPATIENT)
Dept: RADIOLOGY | Facility: CLINIC | Age: 24
Discharge: HOME | End: 2024-06-17
Payer: COMMERCIAL

## 2024-06-17 DIAGNOSIS — O20.9 BLEEDING IN EARLY PREGNANCY (HHS-HCC): ICD-10-CM

## 2024-06-17 PROCEDURE — 76817 TRANSVAGINAL US OBSTETRIC: CPT | Performed by: OBSTETRICS & GYNECOLOGY

## 2024-06-17 PROCEDURE — 76817 TRANSVAGINAL US OBSTETRIC: CPT

## 2024-06-17 PROCEDURE — 76801 OB US < 14 WKS SINGLE FETUS: CPT | Performed by: OBSTETRICS & GYNECOLOGY

## 2024-06-17 PROCEDURE — 76801 OB US < 14 WKS SINGLE FETUS: CPT

## 2024-06-21 ENCOUNTER — TELEMEDICINE (OUTPATIENT)
Dept: OBSTETRICS AND GYNECOLOGY | Facility: CLINIC | Age: 24
End: 2024-06-21
Payer: COMMERCIAL

## 2024-06-21 VITALS — WEIGHT: 183 LBS | HEIGHT: 61 IN | BODY MASS INDEX: 34.55 KG/M2

## 2024-06-21 DIAGNOSIS — O02.0 ANEMBRYONIC PREGNANCY (HHS-HCC): Primary | ICD-10-CM

## 2024-06-21 PROCEDURE — 99213 OFFICE O/P EST LOW 20 MIN: CPT | Performed by: STUDENT IN AN ORGANIZED HEALTH CARE EDUCATION/TRAINING PROGRAM

## 2024-06-21 PROCEDURE — 1036F TOBACCO NON-USER: CPT | Performed by: STUDENT IN AN ORGANIZED HEALTH CARE EDUCATION/TRAINING PROGRAM

## 2024-06-21 RX ORDER — MISOPROSTOL 200 UG/1
800 TABLET ORAL ONCE
Qty: 4 TABLET | Refills: 0 | Status: SHIPPED | OUTPATIENT
Start: 2024-06-21 | End: 2024-06-21

## 2024-06-21 NOTE — PROGRESS NOTES
This visit was provided via telemedicine using two-way, real-timed, interactive telecommunication technology between the patient and the physician which included both audio and video. The patient was offered telemedicine as an option for care delivery and consented to this option.      Subjective   Patient ID [id number]   Rosa Rizo is a 23 y.o.  who presents today for virtual visit for missed . Diagnosed with anembryonic pregnancy measuring 8.1wga on . Pregnancy unplanned but accepted. Denies cramping or bleeding.     OBHx: G1  PMHx: anxiety   SurgHx: denies  Meds/Allergies: reviewed and UTD  Social: vapes   FHx: reviewed and non-contributory to presenting complaint    Objective   Physical Exam  This was a virtual visit    Assessment/Plan     Rosa Rizo is a 23 y.o.  with anembryonic pregnancy measuring 8.1wga who presents today for virtual visit to discuss management options.    Anembryonic Pregnancy  -Reviewed options for management including expectant, medical, and surgical  -After counseling, patient desires medical management. Misoprostol sent to pharmacy. Planning to take tomorrow. Bleeding precautions provided.  -Rh positive, no indication for Rhogam     Patient to call office Monday with an update.    20 minutes were spent in the care of this patient.     Amy Altman MD

## 2024-07-09 ENCOUNTER — TELEPHONE (OUTPATIENT)
Dept: OBSTETRICS AND GYNECOLOGY | Facility: CLINIC | Age: 24
End: 2024-07-09
Payer: COMMERCIAL

## 2024-07-09 NOTE — TELEPHONE ENCOUNTER
Patient left voicemail on OB Navigator line that she has not taken the medication that was prescribed to her for her miscarriage. She has been bleeding for the past 4 days but describes it as light, no clots and not heavy. She did not state anything else, nor that she plans to take the medication. Please advise.

## 2024-07-11 ENCOUNTER — ANESTHESIA (OUTPATIENT)
Dept: OPERATING ROOM | Facility: HOSPITAL | Age: 24
End: 2024-07-11
Payer: COMMERCIAL

## 2024-07-11 ENCOUNTER — ANESTHESIA EVENT (OUTPATIENT)
Dept: OPERATING ROOM | Facility: HOSPITAL | Age: 24
End: 2024-07-11
Payer: COMMERCIAL

## 2024-07-11 ENCOUNTER — HOSPITAL ENCOUNTER (EMERGENCY)
Facility: HOSPITAL | Age: 24
Discharge: HOME | End: 2024-07-12
Attending: STUDENT IN AN ORGANIZED HEALTH CARE EDUCATION/TRAINING PROGRAM
Payer: COMMERCIAL

## 2024-07-11 ENCOUNTER — PREP FOR PROCEDURE (OUTPATIENT)
Dept: SURGERY | Facility: HOSPITAL | Age: 24
End: 2024-07-11

## 2024-07-11 DIAGNOSIS — O02.1 MISSED ABORTION (HHS-HCC): ICD-10-CM

## 2024-07-11 DIAGNOSIS — G89.18 POSTOPERATIVE PAIN: ICD-10-CM

## 2024-07-11 DIAGNOSIS — O03.9 SPONTANEOUS ABORTION IN FIRST TRIMESTER (HHS-HCC): ICD-10-CM

## 2024-07-11 DIAGNOSIS — N93.9 VAGINAL BLEEDING: Primary | ICD-10-CM

## 2024-07-11 PROBLEM — M79.89 SOFT TISSUE MASS: Status: RESOLVED | Noted: 2019-08-26 | Resolved: 2024-07-11

## 2024-07-11 PROBLEM — L02.429 FURUNCLE OF AXILLA: Status: RESOLVED | Noted: 2019-08-26 | Resolved: 2024-07-11

## 2024-07-11 PROBLEM — M54.50 LOW BACK PAIN: Status: RESOLVED | Noted: 2023-04-22 | Resolved: 2024-07-11

## 2024-07-11 PROBLEM — B35.4 TINEA CORPORIS: Status: RESOLVED | Noted: 2020-06-05 | Resolved: 2024-07-11

## 2024-07-11 PROBLEM — L73.9 FOLLICULITIS: Status: RESOLVED | Noted: 2023-04-22 | Resolved: 2024-07-11

## 2024-07-11 PROBLEM — B37.2 CANDIDAL DERMATITIS: Status: RESOLVED | Noted: 2019-08-27 | Resolved: 2024-07-11

## 2024-07-11 PROBLEM — R10.2 PELVIC PAIN IN FEMALE: Status: RESOLVED | Noted: 2023-04-22 | Resolved: 2024-07-11

## 2024-07-11 PROBLEM — R53.83 FATIGUE: Status: RESOLVED | Noted: 2023-04-22 | Resolved: 2024-07-11

## 2024-07-11 PROBLEM — B37.9 CANDIDIASIS: Status: RESOLVED | Noted: 2020-01-23 | Resolved: 2024-07-11

## 2024-07-11 PROBLEM — A49.8 GARDNERELLA INFECTION: Status: RESOLVED | Noted: 2023-04-22 | Resolved: 2024-07-11

## 2024-07-11 PROBLEM — S39.012A LUMBAR STRAIN: Status: RESOLVED | Noted: 2023-04-22 | Resolved: 2024-07-11

## 2024-07-11 LAB
ABO GROUP (TYPE) IN BLOOD: NORMAL
ALBUMIN SERPL BCP-MCNC: 4.2 G/DL (ref 3.4–5)
ALP SERPL-CCNC: 41 U/L (ref 33–110)
ALT SERPL W P-5'-P-CCNC: 7 U/L (ref 7–45)
ANION GAP SERPL CALC-SCNC: 13 MMOL/L (ref 10–20)
ANTIBODY SCREEN: NORMAL
APTT PPP: 31 SECONDS (ref 27–38)
AST SERPL W P-5'-P-CCNC: 12 U/L (ref 9–39)
B-HCG SERPL-ACNC: 1925 MIU/ML
BASOPHILS # BLD AUTO: 0.02 X10*3/UL (ref 0–0.1)
BASOPHILS NFR BLD AUTO: 0.3 %
BILIRUB SERPL-MCNC: 0.3 MG/DL (ref 0–1.2)
BUN SERPL-MCNC: 10 MG/DL (ref 6–23)
CALCIUM SERPL-MCNC: 9.4 MG/DL (ref 8.6–10.3)
CHLORIDE SERPL-SCNC: 109 MMOL/L (ref 98–107)
CO2 SERPL-SCNC: 21 MMOL/L (ref 21–32)
CREAT SERPL-MCNC: 0.88 MG/DL (ref 0.5–1.05)
EGFRCR SERPLBLD CKD-EPI 2021: >90 ML/MIN/1.73M*2
EOSINOPHIL # BLD AUTO: 0.2 X10*3/UL (ref 0–0.7)
EOSINOPHIL NFR BLD AUTO: 2.6 %
ERYTHROCYTE [DISTWIDTH] IN BLOOD BY AUTOMATED COUNT: 12.3 % (ref 11.5–14.5)
FIBRINOGEN PPP-MCNC: 360 MG/DL (ref 200–400)
GLUCOSE SERPL-MCNC: 99 MG/DL (ref 74–99)
HCT VFR BLD AUTO: 34.9 % (ref 36–46)
HGB BLD-MCNC: 12.3 G/DL (ref 12–16)
IMM GRANULOCYTES # BLD AUTO: 0.03 X10*3/UL (ref 0–0.7)
IMM GRANULOCYTES NFR BLD AUTO: 0.4 % (ref 0–0.9)
INR PPP: 1 (ref 0.9–1.1)
LYMPHOCYTES # BLD AUTO: 2.72 X10*3/UL (ref 1.2–4.8)
LYMPHOCYTES NFR BLD AUTO: 34.9 %
MCH RBC QN AUTO: 34.2 PG (ref 26–34)
MCHC RBC AUTO-ENTMCNC: 35.2 G/DL (ref 32–36)
MCV RBC AUTO: 97 FL (ref 80–100)
MONOCYTES # BLD AUTO: 0.59 X10*3/UL (ref 0.1–1)
MONOCYTES NFR BLD AUTO: 7.6 %
NEUTROPHILS # BLD AUTO: 4.24 X10*3/UL (ref 1.2–7.7)
NEUTROPHILS NFR BLD AUTO: 54.2 %
NRBC BLD-RTO: 0 /100 WBCS (ref 0–0)
PLATELET # BLD AUTO: 286 X10*3/UL (ref 150–450)
POTASSIUM SERPL-SCNC: 3.7 MMOL/L (ref 3.5–5.3)
PROT SERPL-MCNC: 6.8 G/DL (ref 6.4–8.2)
PROTHROMBIN TIME: 11.1 SECONDS (ref 9.8–12.8)
RBC # BLD AUTO: 3.6 X10*6/UL (ref 4–5.2)
RH FACTOR (ANTIGEN D): NORMAL
SODIUM SERPL-SCNC: 139 MMOL/L (ref 136–145)
WBC # BLD AUTO: 7.8 X10*3/UL (ref 4.4–11.3)

## 2024-07-11 PROCEDURE — 86901 BLOOD TYPING SEROLOGIC RH(D): CPT | Performed by: STUDENT IN AN ORGANIZED HEALTH CARE EDUCATION/TRAINING PROGRAM

## 2024-07-11 PROCEDURE — 96365 THER/PROPH/DIAG IV INF INIT: CPT | Mod: 59

## 2024-07-11 PROCEDURE — 2500000005 HC RX 250 GENERAL PHARMACY W/O HCPCS: Performed by: STUDENT IN AN ORGANIZED HEALTH CARE EDUCATION/TRAINING PROGRAM

## 2024-07-11 PROCEDURE — 85025 COMPLETE CBC W/AUTO DIFF WBC: CPT | Performed by: STUDENT IN AN ORGANIZED HEALTH CARE EDUCATION/TRAINING PROGRAM

## 2024-07-11 PROCEDURE — 85384 FIBRINOGEN ACTIVITY: CPT | Performed by: STUDENT IN AN ORGANIZED HEALTH CARE EDUCATION/TRAINING PROGRAM

## 2024-07-11 PROCEDURE — 3600000008 HC OR TIME - EACH INCREMENTAL 1 MINUTE - PROCEDURE LEVEL THREE: Performed by: OBSTETRICS & GYNECOLOGY

## 2024-07-11 PROCEDURE — 3700000001 HC GENERAL ANESTHESIA TIME - INITIAL BASE CHARGE: Performed by: OBSTETRICS & GYNECOLOGY

## 2024-07-11 PROCEDURE — 59820 CARE OF MISCARRIAGE: CPT | Performed by: OBSTETRICS & GYNECOLOGY

## 2024-07-11 PROCEDURE — 2500000004 HC RX 250 GENERAL PHARMACY W/ HCPCS (ALT 636 FOR OP/ED): Performed by: STUDENT IN AN ORGANIZED HEALTH CARE EDUCATION/TRAINING PROGRAM

## 2024-07-11 PROCEDURE — 7100000001 HC RECOVERY ROOM TIME - INITIAL BASE CHARGE: Performed by: OBSTETRICS & GYNECOLOGY

## 2024-07-11 PROCEDURE — 3700000002 HC GENERAL ANESTHESIA TIME - EACH INCREMENTAL 1 MINUTE: Performed by: OBSTETRICS & GYNECOLOGY

## 2024-07-11 PROCEDURE — 96375 TX/PRO/DX INJ NEW DRUG ADDON: CPT | Mod: 59

## 2024-07-11 PROCEDURE — 85610 PROTHROMBIN TIME: CPT | Performed by: STUDENT IN AN ORGANIZED HEALTH CARE EDUCATION/TRAINING PROGRAM

## 2024-07-11 PROCEDURE — 7100000002 HC RECOVERY ROOM TIME - EACH INCREMENTAL 1 MINUTE: Performed by: OBSTETRICS & GYNECOLOGY

## 2024-07-11 PROCEDURE — 84702 CHORIONIC GONADOTROPIN TEST: CPT | Performed by: STUDENT IN AN ORGANIZED HEALTH CARE EDUCATION/TRAINING PROGRAM

## 2024-07-11 PROCEDURE — 36415 COLL VENOUS BLD VENIPUNCTURE: CPT | Performed by: STUDENT IN AN ORGANIZED HEALTH CARE EDUCATION/TRAINING PROGRAM

## 2024-07-11 PROCEDURE — 2500000004 HC RX 250 GENERAL PHARMACY W/ HCPCS (ALT 636 FOR OP/ED): Performed by: EMERGENCY MEDICINE

## 2024-07-11 PROCEDURE — 2500000004 HC RX 250 GENERAL PHARMACY W/ HCPCS (ALT 636 FOR OP/ED)

## 2024-07-11 PROCEDURE — 3600000003 HC OR TIME - INITIAL BASE CHARGE - PROCEDURE LEVEL THREE: Performed by: OBSTETRICS & GYNECOLOGY

## 2024-07-11 PROCEDURE — 2500000005 HC RX 250 GENERAL PHARMACY W/O HCPCS: Performed by: EMERGENCY MEDICINE

## 2024-07-11 PROCEDURE — 80053 COMPREHEN METABOLIC PANEL: CPT | Performed by: STUDENT IN AN ORGANIZED HEALTH CARE EDUCATION/TRAINING PROGRAM

## 2024-07-11 RX ORDER — FENTANYL CITRATE 50 UG/ML
INJECTION, SOLUTION INTRAMUSCULAR; INTRAVENOUS AS NEEDED
Status: DISCONTINUED | OUTPATIENT
Start: 2024-07-11 | End: 2024-07-12

## 2024-07-11 RX ORDER — SODIUM CHLORIDE, SODIUM LACTATE, POTASSIUM CHLORIDE, CALCIUM CHLORIDE 600; 310; 30; 20 MG/100ML; MG/100ML; MG/100ML; MG/100ML
100 INJECTION, SOLUTION INTRAVENOUS CONTINUOUS
OUTPATIENT
Start: 2024-07-11

## 2024-07-11 RX ORDER — OXYCODONE HYDROCHLORIDE 5 MG/1
5 TABLET ORAL EVERY 4 HOURS PRN
Status: CANCELLED | OUTPATIENT
Start: 2024-07-11

## 2024-07-11 RX ORDER — LIDOCAINE HYDROCHLORIDE 20 MG/ML
INJECTION, SOLUTION INFILTRATION; PERINEURAL AS NEEDED
Status: DISCONTINUED | OUTPATIENT
Start: 2024-07-11 | End: 2024-07-12

## 2024-07-11 RX ORDER — ONDANSETRON HYDROCHLORIDE 2 MG/ML
INJECTION, SOLUTION INTRAVENOUS AS NEEDED
Status: DISCONTINUED | OUTPATIENT
Start: 2024-07-11 | End: 2024-07-12

## 2024-07-11 RX ORDER — SUCCINYLCHOLINE CHLORIDE 20 MG/ML
INJECTION INTRAMUSCULAR; INTRAVENOUS AS NEEDED
Status: DISCONTINUED | OUTPATIENT
Start: 2024-07-11 | End: 2024-07-12

## 2024-07-11 RX ORDER — PROPOFOL 10 MG/ML
INJECTION, EMULSION INTRAVENOUS AS NEEDED
Status: DISCONTINUED | OUTPATIENT
Start: 2024-07-11 | End: 2024-07-12

## 2024-07-11 RX ORDER — SODIUM CHLORIDE, SODIUM LACTATE, POTASSIUM CHLORIDE, CALCIUM CHLORIDE 600; 310; 30; 20 MG/100ML; MG/100ML; MG/100ML; MG/100ML
100 INJECTION, SOLUTION INTRAVENOUS CONTINUOUS
Status: CANCELLED | OUTPATIENT
Start: 2024-07-11

## 2024-07-11 RX ORDER — TRAMADOL HYDROCHLORIDE 50 MG/1
50 TABLET ORAL EVERY 6 HOURS PRN
Qty: 12 TABLET | Refills: 0 | Status: SHIPPED | OUTPATIENT
Start: 2024-07-11 | End: 2024-07-14

## 2024-07-11 RX ORDER — NAPROXEN 500 MG/1
500 TABLET ORAL 2 TIMES DAILY
Qty: 6 TABLET | Refills: 0 | Status: SHIPPED | OUTPATIENT
Start: 2024-07-11 | End: 2024-07-14

## 2024-07-11 RX ORDER — SODIUM CHLORIDE, SODIUM LACTATE, POTASSIUM CHLORIDE, CALCIUM CHLORIDE 600; 310; 30; 20 MG/100ML; MG/100ML; MG/100ML; MG/100ML
INJECTION, SOLUTION INTRAVENOUS CONTINUOUS PRN
Status: DISCONTINUED | OUTPATIENT
Start: 2024-07-11 | End: 2024-07-12

## 2024-07-11 RX ORDER — MIDAZOLAM HYDROCHLORIDE 1 MG/ML
INJECTION, SOLUTION INTRAMUSCULAR; INTRAVENOUS AS NEEDED
Status: DISCONTINUED | OUTPATIENT
Start: 2024-07-11 | End: 2024-07-12

## 2024-07-11 ASSESSMENT — PAIN DESCRIPTION - LOCATION: LOCATION: VAGINA

## 2024-07-11 ASSESSMENT — PAIN DESCRIPTION - PAIN TYPE: TYPE: ACUTE PAIN

## 2024-07-11 ASSESSMENT — LIFESTYLE VARIABLES
HAVE YOU EVER FELT YOU SHOULD CUT DOWN ON YOUR DRINKING: NO
EVER FELT BAD OR GUILTY ABOUT YOUR DRINKING: NO
TOTAL SCORE: 0
HAVE PEOPLE ANNOYED YOU BY CRITICIZING YOUR DRINKING: NO
EVER HAD A DRINK FIRST THING IN THE MORNING TO STEADY YOUR NERVES TO GET RID OF A HANGOVER: NO

## 2024-07-11 ASSESSMENT — COLUMBIA-SUICIDE SEVERITY RATING SCALE - C-SSRS
1. IN THE PAST MONTH, HAVE YOU WISHED YOU WERE DEAD OR WISHED YOU COULD GO TO SLEEP AND NOT WAKE UP?: NO
6. HAVE YOU EVER DONE ANYTHING, STARTED TO DO ANYTHING, OR PREPARED TO DO ANYTHING TO END YOUR LIFE?: NO
2. HAVE YOU ACTUALLY HAD ANY THOUGHTS OF KILLING YOURSELF?: NO

## 2024-07-11 ASSESSMENT — PAIN SCALES - GENERAL
PAINLEVEL_OUTOF10: 10 - WORST POSSIBLE PAIN
PAINLEVEL_OUTOF10: 7

## 2024-07-11 ASSESSMENT — PAIN SCALES - WONG BAKER: WONGBAKER_NUMERICALRESPONSE: HURTS WHOLE LOT

## 2024-07-11 ASSESSMENT — PAIN - FUNCTIONAL ASSESSMENT: PAIN_FUNCTIONAL_ASSESSMENT: 0-10

## 2024-07-12 ENCOUNTER — TELEPHONE (OUTPATIENT)
Dept: OBSTETRICS AND GYNECOLOGY | Facility: CLINIC | Age: 24
End: 2024-07-12

## 2024-07-12 VITALS
DIASTOLIC BLOOD PRESSURE: 57 MMHG | WEIGHT: 182.98 LBS | RESPIRATION RATE: 16 BRPM | HEART RATE: 67 BPM | HEIGHT: 66 IN | SYSTOLIC BLOOD PRESSURE: 118 MMHG | TEMPERATURE: 97 F | OXYGEN SATURATION: 98 % | BODY MASS INDEX: 29.41 KG/M2

## 2024-07-12 PROCEDURE — 88305 TISSUE EXAM BY PATHOLOGIST: CPT | Mod: TC,ELYLAB | Performed by: OBSTETRICS & GYNECOLOGY

## 2024-07-12 PROCEDURE — 99285 EMERGENCY DEPT VISIT HI MDM: CPT | Mod: 25

## 2024-07-12 PROCEDURE — 2500000004 HC RX 250 GENERAL PHARMACY W/ HCPCS (ALT 636 FOR OP/ED): Performed by: STUDENT IN AN ORGANIZED HEALTH CARE EDUCATION/TRAINING PROGRAM

## 2024-07-12 PROCEDURE — 2500000004 HC RX 250 GENERAL PHARMACY W/ HCPCS (ALT 636 FOR OP/ED)

## 2024-07-12 PROCEDURE — 96375 TX/PRO/DX INJ NEW DRUG ADDON: CPT

## 2024-07-12 RX ORDER — LIDOCAINE HYDROCHLORIDE 10 MG/ML
0.1 INJECTION INFILTRATION; PERINEURAL ONCE
Status: DISCONTINUED | OUTPATIENT
Start: 2024-07-12 | End: 2024-07-12 | Stop reason: HOSPADM

## 2024-07-12 RX ORDER — DIPHENHYDRAMINE HYDROCHLORIDE 50 MG/ML
12.5 INJECTION INTRAMUSCULAR; INTRAVENOUS ONCE AS NEEDED
Status: DISCONTINUED | OUTPATIENT
Start: 2024-07-12 | End: 2024-07-12 | Stop reason: HOSPADM

## 2024-07-12 RX ORDER — DROPERIDOL 2.5 MG/ML
0.62 INJECTION, SOLUTION INTRAMUSCULAR; INTRAVENOUS ONCE AS NEEDED
Status: DISCONTINUED | OUTPATIENT
Start: 2024-07-12 | End: 2024-07-12 | Stop reason: HOSPADM

## 2024-07-12 RX ORDER — FENTANYL CITRATE 50 UG/ML
50 INJECTION, SOLUTION INTRAMUSCULAR; INTRAVENOUS EVERY 5 MIN PRN
Status: DISCONTINUED | OUTPATIENT
Start: 2024-07-12 | End: 2024-07-12 | Stop reason: HOSPADM

## 2024-07-12 RX ORDER — LABETALOL HYDROCHLORIDE 5 MG/ML
5 INJECTION, SOLUTION INTRAVENOUS ONCE AS NEEDED
Status: DISCONTINUED | OUTPATIENT
Start: 2024-07-12 | End: 2024-07-12 | Stop reason: HOSPADM

## 2024-07-12 RX ORDER — MEPERIDINE HYDROCHLORIDE 25 MG/ML
12.5 INJECTION INTRAMUSCULAR; INTRAVENOUS; SUBCUTANEOUS EVERY 10 MIN PRN
Status: DISCONTINUED | OUTPATIENT
Start: 2024-07-12 | End: 2024-07-12 | Stop reason: HOSPADM

## 2024-07-12 RX ORDER — MEPERIDINE HYDROCHLORIDE 25 MG/ML
INJECTION INTRAMUSCULAR; INTRAVENOUS; SUBCUTANEOUS
Status: COMPLETED
Start: 2024-07-12 | End: 2024-07-12

## 2024-07-12 ASSESSMENT — PAIN SCALES - GENERAL
PAIN_LEVEL: 0
PAINLEVEL_OUTOF10: 2
PAINLEVEL_OUTOF10: 2

## 2024-07-12 ASSESSMENT — PAIN - FUNCTIONAL ASSESSMENT
PAIN_FUNCTIONAL_ASSESSMENT: 0-10
PAIN_FUNCTIONAL_ASSESSMENT: 0-10

## 2024-07-12 ASSESSMENT — PAIN DESCRIPTION - DESCRIPTORS
DESCRIPTORS: CRAMPING
DESCRIPTORS: CRAMPING

## 2024-07-12 NOTE — PROCEDURES
Date: 2024  OR Location: [facility] OR    Name: Rosa Rizo, : 2000, Age: 23 y.o., MRN: [id number], Sex: female    Diagnosis  * No surgery found * Missed  at 8 weeks  Vaginal bleeding     Procedures  D  and  C Suction  38191 - [place] INCOMPLETE  ANY TRIMESTER SURGICAL        Surgeons      * Roly Neal - Primary    Resident/Fellow/Other Assistant:  Surgeons and Role:  * No surgeons found with a matching role *    Procedure Summary  Anesthesia: * No anesthesia type entered *  ASA: III  Anesthesia Staff: Anesthesiologist: Paul Gallardo DO    Estimated Blood Loss: < 50 mL     Findings:  moderate amount of POCs, no cervical/vaginal lacerations    Specimens:  POCs    Procedure:    CLINICAL HISTORY:   Patient presented with 1st trimester miscarriage on pelvic ultrasound.  Discussed management including medical management and surgical D&C and patient requests surgical management.  Risks, benefits, and  alternatives were discussed with the patient.  Risks including bleeding, infection, incomplete removal of POCs,  Asherman's Syndrome, or extrauterine injuries related to a uterine perforation.    PROCEDURE:   Patient was taken the operating room after informed consent was obtained to place in supine position.  General anesthesia was started without complication.  She was placed in the dorsal lithotomy position in yellowfin stirrups and an exam under anesthesia was performed.   She was prepped and draped in the normal sterile fashion using Betadine.   A timeout was performed.  A speculum was placed into the vagina and the anterior lip of the cervix was grasped with a single-tooth tenaculum.  A 7 curved suction curette was utilized to removed the POCs.  Bedside ultrasound performed transabdominally with a full bladder which showed no evidence of any further POCs, the endometrium appeared thin.  Moderate amount of POCs had been obtained with the suction curette.  The instrumentation was  removed from the uterine cavity and vagina.  The tenaculum sites were hemostatic.  The patient tolerated the procedure well.   Sponge, lap, needle, and instrument counts were reported as correct x 2.  She was transferred to PACU in stable condition without complications.    The patient declined genetic testing and consented to hospital disposition of miscarriage.  All appropriate miscarriage paperwork were completed prior to the procedure.        Roly Neal, DO

## 2024-07-12 NOTE — TELEPHONE ENCOUNTER
Patient called to let Dr Altman know that she ended up going to [facility] due to intense cramping and bleeding.  Per patient, she ended up getting an emergency D&C  with Dr Neal due to hemorrhaging,     Patient is at home and doing much better.  Due to transportation, scheduled 2 week post op with Dr Gandara at the [place] location.

## 2024-07-12 NOTE — ED PROVIDER NOTES
HPI: The patient is a 23-year-old who is a G1, P0 who was diagnosed with an an embryonic pregnancy last month on 6/17 who was 8 weeks pregnant at that time who presents to the Emergency Department with a chief complaint of vaginal bleeding.  Patient reports that the initial recommendation was to take misoprostol however the patient preferred to manage this expectantly and reports that over the last week she has had a small amount of vaginal bleeding and abdominal cramping that got significantly worse in the last 2 hours and she reports that a large amount of blood started coming out of her vagina over the last couple hours.  She denies any lightheadedness and denies any history of easy bruising or bleeding.  Patient reported she did not pass any products of conception      PAST MEDICAL HISTORY:  as per HPI  ALLERGIES:  as per HPI  MEDICATIONS:  as per HPI  FAMILY HISTORY: as per HPI  SURGICAL HISTORY: as per HPI  SOCIAL HISTORY: as per HPI     PHYSICAL EXAM:  VITAL SIGNS: Nursing notes reviewed.  GENERAL:  Alert and interactive  EYES:   Eyes track.  ENT:  Airway patent.  RESPIRATORY:  Nonlabored breathing.  CARDIOVASCULAR:  [Regular rate.] [Regular rhythm.]  GASTROINTESTINAL:  No distension.  MUSCULOSKELETAL:  No deformity.   NEUROLOGICAL:  Awake.  SKIN:  Dry.    For the entirety of this portion of the physical exam, a [chaperone] was present.  : Clots were sitting in the vaginal vault and I was unable to fully evacuate all the blood given that more bright red looking blood seem to accumulate in the vaginal vault as attempts were made to evacuate the clots.  No lacerations were seen     MEDICAL DECISION MAKING (MDM):     DIAGNOSTIC STUDIES  Labs: CBC, coagulation screen, CMP, fibrinogen level, type and screen     REVIEW OF OLD RECORDS: I reviewed the OB/GYN note from 6/21/2024 as well as the transvaginal ultrasound from 6/17/2024    Critical Care Time  Authorized and Performed by: Sai Tse MD  Total critical  care time: [32] minutes  Due to a high probability of clinically significant, life threatening deterioration, the patient required my highest level of preparedness to intervene emergently and I personally spent this critical care time directly and personally managing the patient. This critical care time included obtaining a history; examining the patient; pulse oximetry; ordering and review of studies; arranging urgent treatment with development of a management plan; evaluation of patient's response to treatment; frequent reassessment; and, discussions with other providers and patient/family.  This critical care time was performed to assess and manage the high probability of imminent, life-threatening deterioration that could result in multi-organ failure. It was exclusive of separately billable procedures and treating other patients and teaching time.  Please see MDM section and the rest of the note for further information on patient assessment and treatment.     SUMMARY:  The patient is admitted to the Emergency Department for evaluation of above. Complete history and physical examination was performed by me.  Patient presents with active vaginal bleeding in the setting of having a recent diagnosis of an a embryonic pregnancy.  IVs were placed and she was given a liter of fluids and blood work was obtained including a fibrinogen level and coagulation screen and type and screen.  I did a pelvic exam as described above and try to evacuate all the blood products but was unable to do so due to reaccumulation of blood that seem to be coming from the cervix.  No lacerations were identified.  I did give the patient a gram of TXA to try and decrease the amount of blood loss and monitored her closely for changes in her hemodynamics.  We immediately reached out to our on-call GYN and spoke with Dr. Neal who agreed to take the patient to the operating room for definitive control of the bleeding.  She agreed with giving TXA.   Patient was given a couple doses of Dilaudid to try and control her pain.  Review of chart shows that the patient was Rh+ when she was seen by her outpatient OB/GYN so no indication for RhoGAM.     DIAGNOSIS:    Vaginal bleeding  Active hemorrhage     DISPOSITION:    1) OR       Sai Tse MD  07/11/24 [id number]

## 2024-07-12 NOTE — H&P
"History Of Present Illness  Rosa Rizo is a 23 y.o. female presenting to ER with heavy VB with miscarriage.     Past Medical History  Past Medical History:   Diagnosis Date    Vaginal discharge 04/22/2023       Surgical History  Past Surgical History:   Procedure Laterality Date    OTHER SURGICAL HISTORY  06/25/2020    Tooth extraction        Social History  She reports that she has quit smoking. Her smoking use included cigarettes. She has never been exposed to tobacco smoke. She has never used smokeless tobacco. She reports that she does not currently use alcohol. She reports current drug use. Drug: Marijuana.    Family History  Family History   Problem Relation Name Age of Onset    Epilepsy Mother      Cervical cancer Mother          Allergies  Patient has no known allergies.    Review of systems   12 point review of systems was performed and noncontributory    Physical exam  General: Appears stated age, no acute distress   Head: NCAT  Skin: Not diaphoretic, no flushing,   Eye: PERRL, EOMI   Respiratory: No respiratory distress or shortness of breath   Musculoskeletal:  BLE and BUE movement intact   Neuro: normal speech, no gait abnormalities noted  Psych: normal affect     Last Recorded Vitals  Blood pressure 120/73, pulse 68, temperature 36.5 °C (97.7 °F), temperature source Temporal, resp. rate 18, height 1.676 m (5' 6\"), weight 83 kg (182 lb 15.7 oz), last menstrual period 04/21/2024, SpO2 99%, unknown if currently breastfeeding.    Relevant Results           Assessment/Plan     Suction D&C for heavy bleeding with known blighted ovum at 8 weeks.      Roly Neal, DO    "

## 2024-07-12 NOTE — ANESTHESIA POSTPROCEDURE EVALUATION
Patient: Rosa Rizo    Procedure Summary       Date: 07/11/24 Room / Location: [facility] OR 11 / Virtual [facility]    Anesthesia Start: 2332 Anesthesia Stop:     Procedure: D  and  C Suction Diagnosis: (miscarriage)    Surgeons: Roly Neal DO Responsible Provider: Paul Gallardo DO    Anesthesia Type: general ASA Status: 3 - Emergent            Anesthesia Type: general    Vitals Value Taken Time   /61 07/12/24 0013   Temp 36.1 07/12/24 0013   Pulse 84 07/12/24 0013   Resp 15 07/12/24 0013   SpO2 100 07/12/24 0013       Anesthesia Post Evaluation    Patient location during evaluation: bedside  Patient participation: complete - patient participated  Level of consciousness: awake and alert  Pain score: 0  Pain management: adequate  Airway patency: patent  Cardiovascular status: acceptable  Respiratory status: acceptable  Hydration status: acceptable  Postoperative Nausea and Vomiting: none        No notable events documented.

## 2024-07-12 NOTE — ANESTHESIA PROCEDURE NOTES
Airway  Date/Time: 7/11/2024 11:39 PM  Urgency: elective    Airway not difficult    Staffing  Performed: attending   Authorized by: Paul Gallardo DO    Performed by: Paul Gallardo DO  Patient location during procedure: OR    Indications and Patient Condition  Indications for airway management: anesthesia  Sedation level: deep  Preoxygenated: yes      Final Airway Details  Final airway type: endotracheal airway      Successful airway: ETT     Successful intubation technique: direct laryngoscopy  Facilitating devices/methods: intubating stylet and cricoid pressure  Blade: Bria  Blade size: #4  ETT size (mm): 7.0  Cormack-Lehane Classification: grade I - full view of glottis  Measured from: lips  ETT to lips (cm): 22  Number of attempts at approach: 1

## 2024-07-12 NOTE — OP NOTE
OP NOTE:    See prior op note completed last night--I was not able to open up an op note because of it being an urgent add-on surgical care from ER.  I put op note in under procedure note instead, as that was all that was available for me to complete last night.    Roly Neal, DO

## 2024-07-12 NOTE — DISCHARGE INSTRUCTIONS
-Follow-up for virtual postop visit in 2 weeks with Dr. Neal.  -Cancel scheduled follow up ultrasound.

## 2024-07-12 NOTE — TELEPHONE ENCOUNTER
Patient left message on ob line requesting a return to work note, so she is able to return to work tomorrow.  Patient had emergency D&C yesterday with Dr Neal at [place] and missed work today.    2 week postop scheduled with Dr Gandara on 7/18.    Is patient ok to return to work without restrictions?  Please advise

## 2024-07-16 ENCOUNTER — APPOINTMENT (OUTPATIENT)
Dept: RADIOLOGY | Facility: CLINIC | Age: 24
End: 2024-07-16
Payer: COMMERCIAL

## 2024-07-16 LAB
LABORATORY COMMENT REPORT: NORMAL
PATH REPORT.FINAL DX SPEC: NORMAL
PATH REPORT.GROSS SPEC: NORMAL
PATH REPORT.RELEVANT HX SPEC: NORMAL
PATH REPORT.TOTAL CANCER: NORMAL

## 2024-07-26 ENCOUNTER — APPOINTMENT (OUTPATIENT)
Dept: OBSTETRICS AND GYNECOLOGY | Facility: CLINIC | Age: 24
End: 2024-07-26
Payer: COMMERCIAL

## 2024-10-07 ENCOUNTER — APPOINTMENT (OUTPATIENT)
Dept: PRIMARY CARE | Facility: CLINIC | Age: 24
End: 2024-10-07
Payer: COMMERCIAL

## 2024-10-15 ENCOUNTER — APPOINTMENT (OUTPATIENT)
Dept: OBSTETRICS AND GYNECOLOGY | Facility: CLINIC | Age: 24
End: 2024-10-15
Payer: COMMERCIAL

## 2024-11-07 ENCOUNTER — LAB (OUTPATIENT)
Dept: LAB | Facility: LAB | Age: 24
End: 2024-11-07
Payer: COMMERCIAL

## 2024-11-07 ENCOUNTER — OFFICE VISIT (OUTPATIENT)
Dept: OBSTETRICS AND GYNECOLOGY | Facility: CLINIC | Age: 24
End: 2024-11-07
Payer: COMMERCIAL

## 2024-11-07 VITALS — WEIGHT: 173.5 LBS | SYSTOLIC BLOOD PRESSURE: 110 MMHG | BODY MASS INDEX: 28 KG/M2 | DIASTOLIC BLOOD PRESSURE: 70 MMHG

## 2024-11-07 DIAGNOSIS — Z20.2 POSSIBLE EXPOSURE TO STD: Primary | ICD-10-CM

## 2024-11-07 DIAGNOSIS — Z20.2 POSSIBLE EXPOSURE TO STD: ICD-10-CM

## 2024-11-07 LAB
HBV SURFACE AG SERPL QL IA: NONREACTIVE
HCV AB SER QL: NONREACTIVE
HIV 1+2 AB+HIV1 P24 AG SERPL QL IA: NONREACTIVE
TREPONEMA PALLIDUM IGG+IGM AB [PRESENCE] IN SERUM OR PLASMA BY IMMUNOASSAY: NONREACTIVE

## 2024-11-07 PROCEDURE — 87389 HIV-1 AG W/HIV-1&-2 AB AG IA: CPT

## 2024-11-07 PROCEDURE — 87205 SMEAR GRAM STAIN: CPT

## 2024-11-07 PROCEDURE — 36415 COLL VENOUS BLD VENIPUNCTURE: CPT

## 2024-11-07 PROCEDURE — 99213 OFFICE O/P EST LOW 20 MIN: CPT | Performed by: ADVANCED PRACTICE MIDWIFE

## 2024-11-07 PROCEDURE — 87591 N.GONORRHOEAE DNA AMP PROB: CPT

## 2024-11-07 PROCEDURE — 87340 HEPATITIS B SURFACE AG IA: CPT

## 2024-11-07 PROCEDURE — 87491 CHLMYD TRACH DNA AMP PROBE: CPT

## 2024-11-07 PROCEDURE — 86803 HEPATITIS C AB TEST: CPT

## 2024-11-07 PROCEDURE — 86780 TREPONEMA PALLIDUM: CPT

## 2024-11-07 PROCEDURE — 87661 TRICHOMONAS VAGINALIS AMPLIF: CPT

## 2024-11-07 RX ORDER — FLUCONAZOLE 150 MG/1
150 TABLET ORAL DAILY
Qty: 2 TABLET | Refills: 1 | Status: SHIPPED | OUTPATIENT
Start: 2024-11-07 | End: 2024-11-09

## 2024-11-07 NOTE — PROGRESS NOTES
GYNECOLOGY PROGRESS NOTE        CC:  see below.  Chief Complaint   Patient presents with    Follow-up     Est pt visit.   Patient states has clumpy discharge and vulva itching. Has changed detergent and has a new partner. Wants STD with blood work          HPI:  Rosa Rizo is her with a complaints of vaginal discharge started today with itching that has been occurring x 1 week.   She denies any new sexual partners, recent antibiotics or new soaps. Has changed detergents.       ROS:  GYN - see HPI        PHYSICAL EXAM:  /70 (BP Location: Left arm, Patient Position: Sitting, BP Cuff Size: Adult)   Wt 78.7 kg (173 lb 8 oz)   BMI 28.00 kg/m²   GEN:  A&O, NAD  URO:  normal urethra, bladder NT  GYN:  normal vulva and perineum w/o lesions or ulcers, vagina with scant discharge  no lesions, LYMPH:  no inguinal lymphadenopathy  PSYCH:  normal affect, non-anxious      IMPRESSION/PLAN:    A: Possible STI exposure. Vaginal discharge and itching  Plan; 1. STI cx. 2. Vaginal cx. 3. STI blood work. 4. Rx for Diflucan 1 tablet daily x 2 days.     Problem List Items Addressed This Visit    None     Vaginal discharge:  Examination consistent with yeast.  Rx for Diflucan provided, use/AE reviewed.  STD cultures done.  Vulvar hygiene measures and condom use for STD prevention reviewed.       SHAHANA Barnett

## 2024-11-08 LAB
C TRACH RRNA SPEC QL NAA+PROBE: NEGATIVE
N GONORRHOEA DNA SPEC QL PROBE+SIG AMP: NEGATIVE
T VAGINALIS RRNA SPEC QL NAA+PROBE: POSITIVE

## 2024-11-10 LAB
CLUE CELLS VAG LPF-#/AREA: NORMAL /[LPF]
NUGENT SCORE: 3
YEAST VAG WET PREP-#/AREA: NORMAL

## 2024-11-11 DIAGNOSIS — A59.9 TRICHOMONIASIS: Primary | ICD-10-CM

## 2024-11-11 RX ORDER — METRONIDAZOLE 500 MG/1
2000 TABLET ORAL ONCE
Qty: 4 TABLET | Refills: 1 | Status: SHIPPED | OUTPATIENT
Start: 2024-11-11 | End: 2024-11-11

## 2024-12-03 ENCOUNTER — APPOINTMENT (OUTPATIENT)
Dept: RADIOLOGY | Facility: HOSPITAL | Age: 24
End: 2024-12-03
Payer: COMMERCIAL

## 2024-12-03 ENCOUNTER — HOSPITAL ENCOUNTER (EMERGENCY)
Facility: HOSPITAL | Age: 24
Discharge: HOME | End: 2024-12-03
Payer: COMMERCIAL

## 2024-12-03 VITALS
SYSTOLIC BLOOD PRESSURE: 121 MMHG | HEIGHT: 60 IN | WEIGHT: 170 LBS | TEMPERATURE: 98.4 F | RESPIRATION RATE: 18 BRPM | BODY MASS INDEX: 33.38 KG/M2 | HEART RATE: 60 BPM | DIASTOLIC BLOOD PRESSURE: 64 MMHG | OXYGEN SATURATION: 96 %

## 2024-12-03 DIAGNOSIS — R10.32 LEFT LOWER QUADRANT ABDOMINAL PAIN: Primary | ICD-10-CM

## 2024-12-03 DIAGNOSIS — R10.12 LEFT UPPER QUADRANT ABDOMINAL PAIN: ICD-10-CM

## 2024-12-03 LAB
ALBUMIN SERPL BCP-MCNC: 3.8 G/DL (ref 3.4–5)
ALP SERPL-CCNC: 44 U/L (ref 33–110)
ALT SERPL W P-5'-P-CCNC: 9 U/L (ref 7–45)
ANION GAP SERPL CALC-SCNC: 9 MMOL/L (ref 10–20)
APPEARANCE UR: ABNORMAL
AST SERPL W P-5'-P-CCNC: 15 U/L (ref 9–39)
B-HCG SERPL-ACNC: <2 MIU/ML
BACTERIA #/AREA URNS AUTO: ABNORMAL /HPF
BASOPHILS # BLD AUTO: 0.01 X10*3/UL (ref 0–0.1)
BASOPHILS NFR BLD AUTO: 0.2 %
BILIRUB SERPL-MCNC: 0.3 MG/DL (ref 0–1.2)
BILIRUB UR STRIP.AUTO-MCNC: NEGATIVE MG/DL
BUN SERPL-MCNC: 12 MG/DL (ref 6–23)
CALCIUM SERPL-MCNC: 8.6 MG/DL (ref 8.6–10.3)
CHLORIDE SERPL-SCNC: 110 MMOL/L (ref 98–107)
CO2 SERPL-SCNC: 25 MMOL/L (ref 21–32)
COLOR UR: YELLOW
CREAT SERPL-MCNC: 0.65 MG/DL (ref 0.5–1.05)
EGFRCR SERPLBLD CKD-EPI 2021: >90 ML/MIN/1.73M*2
EOSINOPHIL # BLD AUTO: 0.09 X10*3/UL (ref 0–0.7)
EOSINOPHIL NFR BLD AUTO: 2.1 %
ERYTHROCYTE [DISTWIDTH] IN BLOOD BY AUTOMATED COUNT: 13 % (ref 11.5–14.5)
GLUCOSE SERPL-MCNC: 86 MG/DL (ref 74–99)
GLUCOSE UR STRIP.AUTO-MCNC: NORMAL MG/DL
HCT VFR BLD AUTO: 31.1 % (ref 36–46)
HGB BLD-MCNC: 10.5 G/DL (ref 12–16)
HOLD SPECIMEN: NORMAL
HOLD SPECIMEN: NORMAL
IMM GRANULOCYTES # BLD AUTO: 0.02 X10*3/UL (ref 0–0.7)
IMM GRANULOCYTES NFR BLD AUTO: 0.5 % (ref 0–0.9)
KETONES UR STRIP.AUTO-MCNC: NEGATIVE MG/DL
LEUKOCYTE ESTERASE UR QL STRIP.AUTO: ABNORMAL
LYMPHOCYTES # BLD AUTO: 1.08 X10*3/UL (ref 1.2–4.8)
LYMPHOCYTES NFR BLD AUTO: 25.7 %
MAGNESIUM SERPL-MCNC: 1.82 MG/DL (ref 1.6–2.4)
MCH RBC QN AUTO: 33.5 PG (ref 26–34)
MCHC RBC AUTO-ENTMCNC: 33.8 G/DL (ref 32–36)
MCV RBC AUTO: 99 FL (ref 80–100)
MONOCYTES # BLD AUTO: 0.52 X10*3/UL (ref 0.1–1)
MONOCYTES NFR BLD AUTO: 12.4 %
MUCOUS THREADS #/AREA URNS AUTO: ABNORMAL /LPF
NEUTROPHILS # BLD AUTO: 2.48 X10*3/UL (ref 1.2–7.7)
NEUTROPHILS NFR BLD AUTO: 59.1 %
NITRITE UR QL STRIP.AUTO: NEGATIVE
NRBC BLD-RTO: 0 /100 WBCS (ref 0–0)
PH UR STRIP.AUTO: 6 [PH]
PLATELET # BLD AUTO: 199 X10*3/UL (ref 150–450)
POTASSIUM SERPL-SCNC: 3.7 MMOL/L (ref 3.5–5.3)
PROT SERPL-MCNC: 6.2 G/DL (ref 6.4–8.2)
PROT UR STRIP.AUTO-MCNC: ABNORMAL MG/DL
RBC # BLD AUTO: 3.13 X10*6/UL (ref 4–5.2)
RBC # UR STRIP.AUTO: NEGATIVE /UL
RBC #/AREA URNS AUTO: ABNORMAL /HPF
SODIUM SERPL-SCNC: 140 MMOL/L (ref 136–145)
SP GR UR STRIP.AUTO: 1.02
SQUAMOUS #/AREA URNS AUTO: ABNORMAL /HPF
UROBILINOGEN UR STRIP.AUTO-MCNC: NORMAL MG/DL
WBC # BLD AUTO: 4.2 X10*3/UL (ref 4.4–11.3)
WBC #/AREA URNS AUTO: ABNORMAL /HPF

## 2024-12-03 PROCEDURE — 2500000001 HC RX 250 WO HCPCS SELF ADMINISTERED DRUGS (ALT 637 FOR MEDICARE OP): Performed by: REGISTERED NURSE

## 2024-12-03 PROCEDURE — 81001 URINALYSIS AUTO W/SCOPE: CPT | Performed by: REGISTERED NURSE

## 2024-12-03 PROCEDURE — 96374 THER/PROPH/DIAG INJ IV PUSH: CPT | Mod: 59

## 2024-12-03 PROCEDURE — 74177 CT ABD & PELVIS W/CONTRAST: CPT | Performed by: RADIOLOGY

## 2024-12-03 PROCEDURE — 99285 EMERGENCY DEPT VISIT HI MDM: CPT | Mod: 25

## 2024-12-03 PROCEDURE — 85025 COMPLETE CBC W/AUTO DIFF WBC: CPT | Performed by: REGISTERED NURSE

## 2024-12-03 PROCEDURE — 36415 COLL VENOUS BLD VENIPUNCTURE: CPT | Performed by: REGISTERED NURSE

## 2024-12-03 PROCEDURE — 99284 EMERGENCY DEPT VISIT MOD MDM: CPT | Mod: 25

## 2024-12-03 PROCEDURE — 84702 CHORIONIC GONADOTROPIN TEST: CPT | Performed by: REGISTERED NURSE

## 2024-12-03 PROCEDURE — 83735 ASSAY OF MAGNESIUM: CPT | Performed by: REGISTERED NURSE

## 2024-12-03 PROCEDURE — 84075 ASSAY ALKALINE PHOSPHATASE: CPT | Performed by: REGISTERED NURSE

## 2024-12-03 PROCEDURE — 2500000004 HC RX 250 GENERAL PHARMACY W/ HCPCS (ALT 636 FOR OP/ED): Performed by: REGISTERED NURSE

## 2024-12-03 PROCEDURE — 87086 URINE CULTURE/COLONY COUNT: CPT | Mod: ELYLAB | Performed by: REGISTERED NURSE

## 2024-12-03 PROCEDURE — 2550000001 HC RX 255 CONTRASTS: Performed by: REGISTERED NURSE

## 2024-12-03 PROCEDURE — 74177 CT ABD & PELVIS W/CONTRAST: CPT

## 2024-12-03 RX ORDER — ONDANSETRON HYDROCHLORIDE 2 MG/ML
4 INJECTION, SOLUTION INTRAVENOUS ONCE
Status: COMPLETED | OUTPATIENT
Start: 2024-12-03 | End: 2024-12-03

## 2024-12-03 RX ORDER — DICYCLOMINE HYDROCHLORIDE 10 MG/1
10 CAPSULE ORAL ONCE
Status: COMPLETED | OUTPATIENT
Start: 2024-12-03 | End: 2024-12-03

## 2024-12-03 RX ORDER — ONDANSETRON 4 MG/1
4 TABLET, ORALLY DISINTEGRATING ORAL EVERY 8 HOURS PRN
Qty: 20 TABLET | Refills: 0 | Status: SHIPPED | OUTPATIENT
Start: 2024-12-03 | End: 2024-12-10

## 2024-12-03 RX ORDER — DICYCLOMINE HYDROCHLORIDE 20 MG/1
20 TABLET ORAL 2 TIMES DAILY
Qty: 20 TABLET | Refills: 0 | Status: SHIPPED | OUTPATIENT
Start: 2024-12-03 | End: 2024-12-13

## 2024-12-03 RX ORDER — DICYCLOMINE HYDROCHLORIDE 10 MG/1
10 CAPSULE ORAL ONCE
Status: DISCONTINUED | OUTPATIENT
Start: 2024-12-03 | End: 2024-12-03

## 2024-12-03 ASSESSMENT — PAIN - FUNCTIONAL ASSESSMENT
PAIN_FUNCTIONAL_ASSESSMENT: 0-10
PAIN_FUNCTIONAL_ASSESSMENT: 0-10

## 2024-12-03 ASSESSMENT — LIFESTYLE VARIABLES
TOTAL SCORE: 0
EVER HAD A DRINK FIRST THING IN THE MORNING TO STEADY YOUR NERVES TO GET RID OF A HANGOVER: NO
HAVE YOU EVER FELT YOU SHOULD CUT DOWN ON YOUR DRINKING: NO
HAVE PEOPLE ANNOYED YOU BY CRITICIZING YOUR DRINKING: NO
EVER FELT BAD OR GUILTY ABOUT YOUR DRINKING: NO

## 2024-12-03 ASSESSMENT — PAIN SCALES - GENERAL: PAINLEVEL_OUTOF10: 10 - WORST POSSIBLE PAIN

## 2024-12-03 NOTE — ED PROVIDER NOTES
"HPI   Chief Complaint   Patient presents with    Abdominal Pain     'Here for stomach pain and had on Friday N/V/D and that went away and now I have the stomach pain still.\"     24-year-old female presents emergency department today for evaluation of left upper quadrant pain.  Patient tells me on Friday after Thanksgiving she had nausea vomiting and diarrhea.  She tells me that that has gone away however she is still having pain in her left upper quadrant.  Patient currently rates her pain a 10/10 on the pain scale.  Patient tells me she is also feeling nauseated.  Patient denies any chest pain shortness of breath.  Patient denies fever or chills.  Patient tells me she is been having bowel movements and burping and attempts to ease her symptoms with no relief.  Patient tells me she smokes marijuana all day via vape.  We also discussed that her symptoms may be related to marijuana use.      History provided by:  Medical records   used: No            Patient History   Past Medical History:   Diagnosis Date    Vaginal discharge 04/22/2023     Past Surgical History:   Procedure Laterality Date    OTHER SURGICAL HISTORY  06/25/2020    Tooth extraction     Family History   Problem Relation Name Age of Onset    Epilepsy Mother      Cervical cancer Mother       Social History     Tobacco Use    Smoking status: Former     Types: Cigarettes     Passive exposure: Never    Smokeless tobacco: Never   Vaping Use    Vaping status: Every Day    Substances: Nicotine   Substance Use Topics    Alcohol use: Not Currently    Drug use: Yes     Types: Marijuana       Physical Exam   ED Triage Vitals [12/03/24 0813]   Temperature Heart Rate Respirations BP   36 °C (96.8 °F) 58 18 117/60      Pulse Ox Temp Source Heart Rate Source Patient Position   98 % Temporal Monitor Sitting      BP Location FiO2 (%)     Right arm --       Physical Exam  Vitals and nursing note reviewed.   Constitutional:       Appearance: She is " well-developed.   HENT:      Head: Normocephalic and atraumatic.   Cardiovascular:      Rate and Rhythm: Normal rate and regular rhythm.      Heart sounds: Normal heart sounds.   Pulmonary:      Effort: Pulmonary effort is normal.      Breath sounds: Normal breath sounds.   Abdominal:      Palpations: Abdomen is soft.      Tenderness: There is abdominal tenderness in the left upper quadrant.   Skin:     General: Skin is warm and dry.      Capillary Refill: Capillary refill takes less than 2 seconds.   Neurological:      General: No focal deficit present.      Mental Status: She is alert and oriented to person, place, and time.   Psychiatric:         Mood and Affect: Mood normal.         Behavior: Behavior normal.           ED Course & MDM   Diagnoses as of 12/03/24 1152   Left lower quadrant abdominal pain   Left upper quadrant abdominal pain                 No data recorded                                 Medical Decision Making    Patient seen exam emergency department arrival patient had a nontoxic appearance and does not appear in acute distress.  Patient's respirations are even unlabored, skin is warm dry no diaphoresis noted.  Patient's lung sounds are clear to auscultation.  Heart regular rate and rhythm without a murmur appreciated.  Abdomen is soft, patient is experiencing tenderness with palpation in the left upper quadrant.    Will obtain basic labs and beta quant prior to ordering any CT imaging which patient is in agreement with.  Patient's beta quant was negative therefore CT of the abdomen pelvis was ordered.  Patient's symptoms treated with Bentyl and Zofran.    Patient CBC does show slight anemia otherwise unremarkable.  CMP unremarkable.  Urinalysis is positive for leukocytes but negative for nitrites.  Beta quant is also negative.    CT of the abdomen pelvis is unremarkable per radiology read.    Upon reevaluation patient tells me she is feeling better post menstruation medications and is ready to  go home.  Patient I discussed her results as well as medications prescribed to treat her symptoms at home.  I did have registration make patient appointment with gastroenterology prior to discharge.  Patient I again discussed that her symptoms may be related to her marijuana use.  All patient's questions and concerns were addressed prior to discharge.  Patient discharged home in stable condition.    Labs Reviewed   CBC WITH AUTO DIFFERENTIAL - Abnormal       Result Value    WBC 4.2 (*)     nRBC 0.0      RBC 3.13 (*)     Hemoglobin 10.5 (*)     Hematocrit 31.1 (*)     MCV 99      MCH 33.5      MCHC 33.8      RDW 13.0      Platelets 199      Neutrophils % 59.1      Immature Granulocytes %, Automated 0.5      Lymphocytes % 25.7      Monocytes % 12.4      Eosinophils % 2.1      Basophils % 0.2      Neutrophils Absolute 2.48      Immature Granulocytes Absolute, Automated 0.02      Lymphocytes Absolute 1.08 (*)     Monocytes Absolute 0.52      Eosinophils Absolute 0.09      Basophils Absolute 0.01     COMPREHENSIVE METABOLIC PANEL - Abnormal    Glucose 86      Sodium 140      Potassium 3.7      Chloride 110 (*)     Bicarbonate 25      Anion Gap 9 (*)     Urea Nitrogen 12      Creatinine 0.65      eGFR >90      Calcium 8.6      Albumin 3.8      Alkaline Phosphatase 44      Total Protein 6.2 (*)     AST 15      Bilirubin, Total 0.3      ALT 9     URINALYSIS WITH REFLEX CULTURE AND MICROSCOPIC - Abnormal    Color, Urine Yellow      Appearance, Urine Turbid (*)     Specific Gravity, Urine 1.024      pH, Urine 6.0      Protein, Urine 20 (TRACE)      Glucose, Urine Normal      Blood, Urine NEGATIVE      Ketones, Urine NEGATIVE      Bilirubin, Urine NEGATIVE      Urobilinogen, Urine Normal      Nitrite, Urine NEGATIVE      Leukocyte Esterase, Urine 25 Edwardo/µL (*)    MICROSCOPIC ONLY, URINE - Abnormal    WBC, Urine 1-5      RBC, Urine 1-2      Squamous Epithelial Cells, Urine 26-50 (1+)      Bacteria, Urine 1+ (*)     Mucus, Urine  FEW     MAGNESIUM - Normal    Magnesium 1.82     HUMAN CHORIONIC GONADOTROPIN, SERUM QUANTITATIVE - Normal    HCG, Beta-Quantitative <2      Narrative:      Total HCG measurement is performed using the Mar[employer] Access   Immunoassay which detects intact HCG and free beta HCG subunit.    This test is not indicated for use as a tumor marker.   HCG testing is performed using a different test methodology at [facility] than other [facility]. Direct result comparison   should only be made within the same method.       URINE CULTURE   GRAY TOP    Extra Tube Hold for add-ons.     URINALYSIS WITH REFLEX CULTURE AND MICROSCOPIC    Narrative:     The following orders were created for panel order Urinalysis with Reflex Culture and Microscopic.  Procedure                               Abnormality         Status                     ---------                               -----------         ------                     Urinalysis with Reflex C...[[id number]]  Abnormal            Final result               Extra Urine Gray Tube[[id number]]                            In process                   Please view results for these tests on the individual orders.   EXTRA URINE GRAY TUBE       CT abdomen pelvis w IV contrast   Final Result   No acute intra-abdominal process.             Signed by: Jose Black 12/3/2024 11:49 AM   Dictation workstation:   [id number]          Procedure  Procedures     Erika Reyes, DONALD-CNP  12/03/24 1158

## 2024-12-03 NOTE — Clinical Note
Rosa Rizo was seen and treated in our emergency department on 12/3/2024.  She may return to work on 12/04/2024.       If you have any questions or concerns, please don't hesitate to call.      Erika Reyes, APRN-CNP

## 2024-12-04 LAB — BACTERIA UR CULT: NORMAL

## 2024-12-24 ENCOUNTER — APPOINTMENT (OUTPATIENT)
Dept: OBSTETRICS AND GYNECOLOGY | Facility: CLINIC | Age: 24
End: 2024-12-24
Payer: COMMERCIAL

## 2024-12-26 ENCOUNTER — APPOINTMENT (OUTPATIENT)
Dept: OBSTETRICS AND GYNECOLOGY | Facility: CLINIC | Age: 24
End: 2024-12-26
Payer: COMMERCIAL

## 2024-12-26 VITALS — WEIGHT: 171.4 LBS | SYSTOLIC BLOOD PRESSURE: 110 MMHG | DIASTOLIC BLOOD PRESSURE: 70 MMHG | BODY MASS INDEX: 33.47 KG/M2

## 2024-12-26 DIAGNOSIS — Z12.4 SCREENING FOR CERVICAL CANCER: ICD-10-CM

## 2024-12-26 DIAGNOSIS — Z01.419 ENCNTR FOR GYN EXAM (GENERAL) (ROUTINE) W/O ABN FINDINGS: Primary | ICD-10-CM

## 2024-12-26 PROCEDURE — 99395 PREV VISIT EST AGE 18-39: CPT | Performed by: ADVANCED PRACTICE MIDWIFE

## 2024-12-26 PROCEDURE — 1036F TOBACCO NON-USER: CPT | Performed by: ADVANCED PRACTICE MIDWIFE

## 2024-12-26 PROCEDURE — 87491 CHLMYD TRACH DNA AMP PROBE: CPT

## 2024-12-26 PROCEDURE — 87591 N.GONORRHOEAE DNA AMP PROB: CPT

## 2024-12-26 PROCEDURE — 87661 TRICHOMONAS VAGINALIS AMPLIF: CPT

## 2024-12-26 ASSESSMENT — PATIENT HEALTH QUESTIONNAIRE - PHQ9
2. FEELING DOWN, DEPRESSED OR HOPELESS: SEVERAL DAYS
1. LITTLE INTEREST OR PLEASURE IN DOING THINGS: SEVERAL DAYS
SUM OF ALL RESPONSES TO PHQ9 QUESTIONS 1 & 2: 2
10. IF YOU CHECKED OFF ANY PROBLEMS, HOW DIFFICULT HAVE THESE PROBLEMS MADE IT FOR YOU TO DO YOUR WORK, TAKE CARE OF THINGS AT HOME, OR GET ALONG WITH OTHER PEOPLE: NOT DIFFICULT AT ALL

## 2024-12-26 NOTE — PROGRESS NOTES
GYNECOLOGY PROGRESS NOTE        CC:  See below. Patients pap was 2023 ASCUS negative HPV. D/O needing next pap in 2026 but patient would like her pap today and cultures sent. She has a new sexual partner x 1 month. Uses condoms sometimes only. Denies discharge, odor, or itching. LMP 12/14/24. Patient c/o lower abdominal pain that started this morning. Is not sure if it's from recent sex, gas, or a cyst. She has not taken anything for the pain. Appears to be in no distress at the visit.  Chief Complaint   Patient presents with    Annual Exam     Est pt no concerns  Pap 2023         HPI:  Rosa Rizo is here for a routine GYN examination.  No GYN c/o, no AUB.        Contraception:  condoms  Pregnancy plans:  none  Gardasil:  received hers in 2015 and 2019            ROS:  GI - no blood in BMs  URO - no hematuria  GYN - no AUB or vaginal discharge  PSYCH - mood OK        PHYSICAL EXAM:  /70   Wt 77.7 kg (171 lb 6.4 oz)   LMP 12/14/2024   BMI 33.47 kg/m²   GEN:  A&O, NAD  ABD:  NT/ND, soft, no palpable masses  URO:  normal urethra, no bladder TTP  GYN:  normal vulva and perineum w/o lesions or ulcers, normal vagina without discharge or lesions, normal cervix without lesions or discharge or CMT, uterus NT/NE, adnexa mobile and NT/NE  DERM:  no hirsutism or acne   PSYCH:  normal affect, non-anxious      IMPRESSION/PLAN:    A: ASCUS  negative HPV 2023, patient requests pap and STI sent today. Possible STI exposure. Not consistent with condom use.   Plan: 1. Pap with STI off the pap. 2. Follow up 1 year or prn.   Problem List Items Addressed This Visit    None       Pap normal 2023, no Hx of HGSIL, next due in 3 years.  ASCCP pap smear screening guidelines reviewed with the patient.  Annual STD screening done per CDC guidelines if applicable (if < age 25).      SHAHANA Barnett

## 2024-12-28 LAB
C TRACH RRNA SPEC QL NAA+PROBE: NEGATIVE
N GONORRHOEA DNA SPEC QL PROBE+SIG AMP: NEGATIVE
T VAGINALIS RRNA SPEC QL NAA+PROBE: NEGATIVE

## 2025-01-13 LAB
CYTOLOGY CMNT CVX/VAG CYTO-IMP: NORMAL
LAB AP HPV GENOTYPE QUESTION: YES
LAB AP HPV HR: NORMAL
LAB AP PAP ADDITIONAL TESTS: NORMAL
LABORATORY COMMENT REPORT: NORMAL
LMP START DATE: NORMAL
PATH REPORT.TOTAL CANCER: NORMAL

## 2025-01-22 ENCOUNTER — APPOINTMENT (OUTPATIENT)
Dept: OBSTETRICS AND GYNECOLOGY | Facility: CLINIC | Age: 25
End: 2025-01-22
Payer: COMMERCIAL

## 2025-01-28 ENCOUNTER — APPOINTMENT (OUTPATIENT)
Dept: OBSTETRICS AND GYNECOLOGY | Facility: CLINIC | Age: 25
End: 2025-01-28
Payer: COMMERCIAL

## 2025-01-28 VITALS — WEIGHT: 176.4 LBS | BODY MASS INDEX: 34.45 KG/M2 | SYSTOLIC BLOOD PRESSURE: 120 MMHG | DIASTOLIC BLOOD PRESSURE: 70 MMHG

## 2025-01-28 DIAGNOSIS — N89.8 VAGINAL DISCHARGE: Primary | ICD-10-CM

## 2025-01-28 PROCEDURE — 99213 OFFICE O/P EST LOW 20 MIN: CPT | Performed by: ADVANCED PRACTICE MIDWIFE

## 2025-01-28 RX ORDER — FLUCONAZOLE 150 MG/1
150 TABLET ORAL DAILY
Qty: 2 TABLET | Refills: 1 | Status: SHIPPED | OUTPATIENT
Start: 2025-01-28 | End: 2025-01-30

## 2025-01-28 NOTE — PROGRESS NOTES
GYNECOLOGY PROGRESS NOTE        CC:   see below Patient c/o clumpy discharge with itching. Itching noticed before her menses and sometimes after too. Wants all STD cultures sent too. No odor.   Chief Complaint   Patient presents with    STI Screening     Est pt visit.   Patient wants STD testing.   States has some slight itching. Has some discharge        HPI:  Rosa Rizo is her with a complaints of vaginal discharge with itching.   She denies any new sexual partners, recent antibiotics, or new soaps or detergents.  Patient has been using [employer] tampons and she has noticed that they fall apart and some pieces of the tampon fall off and are left behind in the vaginal canal that she then needs to remove herself.       ROS:  GYN - see HPI        PHYSICAL EXAM:  /70 (BP Location: Left arm, Patient Position: Sitting, BP Cuff Size: Adult)   Wt 80 kg (176 lb 6.4 oz)   BMI 34.45 kg/m²   GEN:  A&O, NAD  URO:  normal urethra, bladder NT  GYN:  normal vulva and perineum w/o lesions or ulcers, vagina LYMPH:  no inguinal lymphadenopathy  PSYCH:  normal affect, non-anxious      IMPRESSION/PLAN:    A: vaginal itching and discharge  Plan; 1. Vaginal culture sent. 2. STI off the urine sent. 3. Rx for diflucan 150mg po to take today and may repeat if needed before or after her menses.  Problem List Items Addressed This Visit    None  Visit Diagnoses       Vaginal discharge    -  Primary    Relevant Orders    C. trachomatis / N. gonorrhoeae, Amplified, Urogenital    Trichomonas vaginalis, Amplified    Vaginitis Gram Stain For Bacterial Vaginosis + Yeast             Vaginal discharge:  Examination consistent with yeast.  Rx for diflucan provided, use/AE reviewed.  STD cultures done.  Vulvar hygiene measures and condom use for STD prevention reviewed.       DONALD Barnett-NAYLA   Not applicable

## 2025-01-30 LAB
BV SCORE VAG QL: ABNORMAL
C TRACH RRNA SPEC QL NAA+PROBE: NOT DETECTED
N GONORRHOEA RRNA SPEC QL NAA+PROBE: NOT DETECTED
QUEST GC CT AMPLIFIED (ALWAYS MESSAGE): NORMAL
T VAGINALIS RRNA SPEC QL NAA+PROBE: NOT DETECTED
YEAST SPEC QL CULT: ABNORMAL

## 2025-02-06 DIAGNOSIS — A49.8 GARDNERELLA INFECTION: Primary | ICD-10-CM

## 2025-02-06 RX ORDER — METRONIDAZOLE 500 MG/1
500 TABLET ORAL 2 TIMES DAILY
Qty: 14 TABLET | Refills: 0 | Status: SHIPPED | OUTPATIENT
Start: 2025-02-06 | End: 2025-02-13

## 2025-05-23 ENCOUNTER — APPOINTMENT (OUTPATIENT)
Dept: PRIMARY CARE | Facility: CLINIC | Age: 25
End: 2025-05-23
Payer: COMMERCIAL

## 2025-05-23 NOTE — PROGRESS NOTES
Subjective   Patient ID: Rosa Rizo is a 24 y.o. female who presents for No chief complaint on file..  HPI    Review of Systems    Objective   There were no vitals taken for this visit.   Physical Exam    Assessment/Plan   Problem List Items Addressed This Visit    None      Patient education provided.  Stay current with age appropriate health maintenance as instructed.  Appointment here or ER with new or worsening symptoms'  Keep appropriate follow-up visit.  Stay current with proper immunizations

## 2025-06-15 ENCOUNTER — HOSPITAL ENCOUNTER (EMERGENCY)
Facility: HOSPITAL | Age: 25
Discharge: HOME | End: 2025-06-15
Payer: COMMERCIAL

## 2025-06-15 VITALS
WEIGHT: 175 LBS | DIASTOLIC BLOOD PRESSURE: 83 MMHG | OXYGEN SATURATION: 99 % | RESPIRATION RATE: 16 BRPM | HEIGHT: 61 IN | HEART RATE: 89 BPM | TEMPERATURE: 97.3 F | BODY MASS INDEX: 33.04 KG/M2 | SYSTOLIC BLOOD PRESSURE: 131 MMHG

## 2025-06-15 DIAGNOSIS — S39.012A LUMBAR STRAIN, INITIAL ENCOUNTER: Primary | ICD-10-CM

## 2025-06-15 PROCEDURE — 2500000001 HC RX 250 WO HCPCS SELF ADMINISTERED DRUGS (ALT 637 FOR MEDICARE OP): Performed by: NURSE PRACTITIONER

## 2025-06-15 PROCEDURE — 99284 EMERGENCY DEPT VISIT MOD MDM: CPT

## 2025-06-15 PROCEDURE — 96372 THER/PROPH/DIAG INJ SC/IM: CPT | Performed by: NURSE PRACTITIONER

## 2025-06-15 PROCEDURE — 2500000004 HC RX 250 GENERAL PHARMACY W/ HCPCS (ALT 636 FOR OP/ED): Performed by: NURSE PRACTITIONER

## 2025-06-15 RX ORDER — OXYCODONE AND ACETAMINOPHEN 5; 325 MG/1; MG/1
1 TABLET ORAL ONCE
Refills: 0 | Status: COMPLETED | OUTPATIENT
Start: 2025-06-15 | End: 2025-06-15

## 2025-06-15 RX ORDER — DIAZEPAM 5 MG/ML
5 INJECTION, SOLUTION INTRAMUSCULAR; INTRAVENOUS ONCE
Status: COMPLETED | OUTPATIENT
Start: 2025-06-15 | End: 2025-06-15

## 2025-06-15 RX ORDER — HYDROCODONE BITARTRATE AND ACETAMINOPHEN 5; 325 MG/1; MG/1
1 TABLET ORAL EVERY 6 HOURS PRN
Qty: 12 TABLET | Refills: 0 | Status: SHIPPED | OUTPATIENT
Start: 2025-06-15

## 2025-06-15 RX ORDER — KETOROLAC TROMETHAMINE 30 MG/ML
15 INJECTION, SOLUTION INTRAMUSCULAR; INTRAVENOUS ONCE
Status: COMPLETED | OUTPATIENT
Start: 2025-06-15 | End: 2025-06-15

## 2025-06-15 RX ORDER — KETOROLAC TROMETHAMINE 30 MG/ML
15 INJECTION, SOLUTION INTRAMUSCULAR; INTRAVENOUS ONCE
Status: DISCONTINUED | OUTPATIENT
Start: 2025-06-15 | End: 2025-06-15

## 2025-06-15 RX ADMIN — OXYCODONE HYDROCHLORIDE AND ACETAMINOPHEN 1 TABLET: 5; 325 TABLET ORAL at 12:29

## 2025-06-15 RX ADMIN — KETOROLAC TROMETHAMINE 15 MG: 30 INJECTION, SOLUTION INTRAMUSCULAR at 12:29

## 2025-06-15 RX ADMIN — DIAZEPAM 5 MG: 5 INJECTION INTRAMUSCULAR; INTRAVENOUS at 12:29

## 2025-06-15 ASSESSMENT — PAIN DESCRIPTION - PAIN TYPE: TYPE: ACUTE PAIN

## 2025-06-15 ASSESSMENT — PAIN - FUNCTIONAL ASSESSMENT: PAIN_FUNCTIONAL_ASSESSMENT: 0-10

## 2025-06-15 ASSESSMENT — LIFESTYLE VARIABLES
EVER FELT BAD OR GUILTY ABOUT YOUR DRINKING: NO
TOTAL SCORE: 0
HAVE PEOPLE ANNOYED YOU BY CRITICIZING YOUR DRINKING: NO
HAVE YOU EVER FELT YOU SHOULD CUT DOWN ON YOUR DRINKING: NO
EVER HAD A DRINK FIRST THING IN THE MORNING TO STEADY YOUR NERVES TO GET RID OF A HANGOVER: NO

## 2025-06-15 ASSESSMENT — PAIN DESCRIPTION - LOCATION: LOCATION: BACK

## 2025-06-15 ASSESSMENT — PAIN SCALES - GENERAL: PAINLEVEL_OUTOF10: 8

## 2025-06-15 NOTE — Clinical Note
Rosa Rizo was seen and treated in our emergency department on 6/15/2025.  She may return to work on 06/18/2025.       If you have any questions or concerns, please don't hesitate to call.      Hlaey Harris, APRN-CNP

## 2025-06-15 NOTE — ED PROVIDER NOTES
HPI   Chief Complaint   Patient presents with    Back Pain     Hx of sciatica pain. Went to [facility] 2 days ago for the same thing.        24-year-old female presents the emergency department, patient states for the last several days has been having pain in her lower back, states it started on the left side, now is both sides, radiates into her hips.  Patient describes worsening pain with position changes and standing.  Tells me pain is so much worse this morning she was not able to roll over her get herself out of bed.  Yesterday she had to leave work early.    Tells me she first went to urgent care and then to [facility], states they put her on steroids and muscle relaxers, anti-inflammatories and Lidoderm patches.  She does not feel like these are helping.    Denies any numbness or tingling distal extremities, incontinence of bowel or bladder, saddle anesthesia.  Denies any recent injuries or illness.  Patient denies diabetes or IV drug use.  Healthy female.          History provided by:  Patient   used: No            Patient History   Medical History[1]  Surgical History[2]  Family History[3]  Social History[4]    Physical Exam   ED Triage Vitals [06/15/25 1136]   Temperature Heart Rate Respirations BP   36.3 °C (97.3 °F) 89 16 131/83      Pulse Ox Temp Source Heart Rate Source Patient Position   99 % Temporal Monitor Lying      BP Location FiO2 (%)     Right arm --       Physical Exam  Gen.: Vitals noted no distress. Afebrile.   Heart: Regular rate rhythm no murmur.   Lungs: Clear to auscultation bilaterally with good aeration and no adventitious breath sounds.   Abdomen: Soft, nontender, nonsurgical throughout. Normoactive bowel sounds. No pulsatile masses or abdominal bruits to auscultation.   Back: There is tenderness to palpation in the midline and paraspinal planes throughout the LS. Normal motor sensory, symmetric reflexes, strong equal peripheral pulses, normal  Babinski's bilaterally.   Skin: No rash.   Neuro: No focal neurologic deficits, NIH score of 0.     ED Course & MDM   Diagnoses as of 06/15/25 1219   Lumbar strain, initial encounter                 No data recorded                           Medical Decision Making    Discussed low back strain with the patient's.  This is patient's third visit, although she does not have any red flag symptoms, states pain is worse in the morning.  Discussed follow-up with Ortho tomorrow, she is in agreement with this plan of care.  Requesting work note since she did leave work early yesterday.    Will set her up with orthopedics for close follow-up tomorrow, in the meantime given 5 mg of IM Valium, 15 mg of IM Toradol and an oral Percocet.  I will discharge her home with a short course of narcotics as well in addition to the other medication she already has at home.    Discussed return with any worsening symptoms or additional concerns.  Procedure  Procedures       [1]   Past Medical History:  Diagnosis Date    Vaginal discharge 04/22/2023   [2]   Past Surgical History:  Procedure Laterality Date    OTHER SURGICAL HISTORY  06/25/2020    Tooth extraction   [3]   Family History  Problem Relation Name Age of Onset    Epilepsy Mother      Cervical cancer Mother     [4]   Social History  Tobacco Use    Smoking status: Former     Types: Cigarettes     Passive exposure: Never    Smokeless tobacco: Never   Vaping Use    Vaping status: Every Day    Substances: Nicotine   Substance Use Topics    Alcohol use: Not Currently    Drug use: Yes     Types: Marijuana        DONALD Davila-CNP  06/15/25 1227

## 2025-06-16 ENCOUNTER — HOSPITAL ENCOUNTER (OUTPATIENT)
Dept: RADIOLOGY | Facility: CLINIC | Age: 25
Discharge: HOME | End: 2025-06-16
Payer: COMMERCIAL

## 2025-06-16 ENCOUNTER — OFFICE VISIT (OUTPATIENT)
Dept: ORTHOPEDIC SURGERY | Facility: CLINIC | Age: 25
End: 2025-06-16
Payer: COMMERCIAL

## 2025-06-16 DIAGNOSIS — S39.012A LUMBAR STRAIN, INITIAL ENCOUNTER: Primary | ICD-10-CM

## 2025-06-16 DIAGNOSIS — S39.012A LUMBAR STRAIN, INITIAL ENCOUNTER: ICD-10-CM

## 2025-06-16 PROCEDURE — 72100 X-RAY EXAM L-S SPINE 2/3 VWS: CPT

## 2025-06-16 PROCEDURE — 72100 X-RAY EXAM L-S SPINE 2/3 VWS: CPT | Performed by: STUDENT IN AN ORGANIZED HEALTH CARE EDUCATION/TRAINING PROGRAM

## 2025-06-16 PROCEDURE — 99204 OFFICE O/P NEW MOD 45 MIN: CPT | Performed by: STUDENT IN AN ORGANIZED HEALTH CARE EDUCATION/TRAINING PROGRAM

## 2025-06-16 PROCEDURE — 99212 OFFICE O/P EST SF 10 MIN: CPT | Performed by: STUDENT IN AN ORGANIZED HEALTH CARE EDUCATION/TRAINING PROGRAM

## 2025-06-16 NOTE — PROGRESS NOTES
Acute Injury New Patient Visit    Patient ID: Rosa Rizo is a 24 y.o. female.   History of Present Illness  The patient is a 24-year-old female who presents with a complaint of low back pain.    She reports that her current episode of back pain began last week, localized to the left side. The pain was initially mild but progressively worsened, leading her to seek medical attention at an urgent care facility on 06/10/2025. She describes the pain as shooting and exacerbated by each step she takes. Despite the administration of naproxen, there was no relief. Consequently, she visited the hospital on 06/12/2025 due to her inability to walk. She was given steroids, muscle relaxers, and a lidocaine patch, which provided some relief. However, she continues to experience pain, particularly when bending over, and reports a sensation of tightness in her lower back. The pain radiates down both legs, a symptom that was previously unilateral. She reports no numbness, tingling, electric shocks, or changes in bowel or bladder function.    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 Her job at [employer] involves frequent climbing and prolonged standing, which she believes may be contributing to her discomfort. She has been absent from work due to her condition and has been experiencing difficulty sleeping, which she attributes to the medication. She has refrained from taking Vicodin due to concerns about potential side effects, including vomiting.    SOCIAL HISTORY  Occupations: Works at [employer]       Assessment & Plan  1. Lumbar strain:    Physical therapy is recommended as the primary treatment for lumbar strain. Gradual resumption of work activities is advised. A prescription for physical therapy will be provided. Application of ice and heat, gentle range of motion exercises, and mild stretching are  recommended. After completing the steroid course, ibuprofen 400 to 600 mg every 8 hours for an additional 3 to 5 days is suggested to maintain the anti-inflammatory effect.    Follow-up  Follow up in 6 weeks.       Assessment:   Problem List Items Addressed This Visit    None  Visit Diagnoses         Lumbar strain, initial encounter    -  Primary    Relevant Orders    XR lumbar spine 2-3 views    Referral to Physical Therapy            Diagnostics: Reviewed all relevant imaging including x-ray, MRI, CT, and US.    Results  Imaging   - X-ray: 06/16/2025, No acute fracture or subluxation. No significant degenerative changes.     Procedure:  Procedures  Physical Exam  Musculoskeletal:  Gait: Able to walk with some difficulty due to tightness in lower back  Lumbar spine: Limited range of motion in flexion, extension, rotation, and lateral bending  Lower extremities: Normal strength and sensation       Orders Placed This Encounter    XR lumbar spine 2-3 views    Referral to Physical Therapy      At the conclusion of the visit there were no further questions by the patient/family regarding their plan of care.  Patient was instructed to call or return with any issues, questions, or concerns regarding their injury and/or treatment plan described above.     06/16/25 at 5:47 PM - Stu Neal DO    Office: [phone]    This note was prepared using voice recognition software.  The details of this note are correct and have been reviewed, and corrected to the best of my ability.  Some grammatical errors may persist related to the Dragon software.  This medical note was created with the assistance of artificial intelligence (AI) for documentation purposes. The content has been reviewed and confirmed by the healthcare provider for accuracy and completeness. Patient consented to the use of audio recording and use of AI during their visit.

## 2025-06-30 ENCOUNTER — TELEPHONE (OUTPATIENT)
Dept: OBSTETRICS AND GYNECOLOGY | Facility: CLINIC | Age: 25
End: 2025-06-30

## 2025-06-30 DIAGNOSIS — N91.2 AMENORRHEA: Primary | ICD-10-CM

## 2025-06-30 NOTE — TELEPHONE ENCOUNTER
Patient is here to do a HCG level but the lab does not have any orders in the system.   Can you please place the order and let the patient know when this is placed, she will come back to do the lab once she hears from us.

## 2025-08-07 ENCOUNTER — APPOINTMENT (OUTPATIENT)
Dept: ORTHOPEDIC SURGERY | Facility: CLINIC | Age: 25
End: 2025-08-07
Payer: COMMERCIAL

## 2025-08-26 ENCOUNTER — APPOINTMENT (OUTPATIENT)
Dept: CARDIOLOGY | Facility: HOSPITAL | Age: 25
End: 2025-08-26
Payer: COMMERCIAL

## 2025-08-26 ENCOUNTER — APPOINTMENT (OUTPATIENT)
Dept: RADIOLOGY | Facility: HOSPITAL | Age: 25
End: 2025-08-26
Payer: COMMERCIAL

## 2025-08-26 ENCOUNTER — HOSPITAL ENCOUNTER (EMERGENCY)
Facility: HOSPITAL | Age: 25
Discharge: HOME | End: 2025-08-27
Attending: EMERGENCY MEDICINE
Payer: COMMERCIAL

## 2025-08-26 DIAGNOSIS — R07.9 CHEST PAIN, UNSPECIFIED TYPE: Primary | ICD-10-CM

## 2025-08-26 LAB
ALBUMIN SERPL BCP-MCNC: 4.3 G/DL (ref 3.4–5)
ALP SERPL-CCNC: 48 U/L (ref 33–110)
ALT SERPL W P-5'-P-CCNC: 10 U/L (ref 7–45)
ANION GAP SERPL CALC-SCNC: 8 MMOL/L (ref 10–20)
AST SERPL W P-5'-P-CCNC: 14 U/L (ref 9–39)
B-HCG SERPL-ACNC: <2 MIU/ML
BASOPHILS # BLD AUTO: 0.03 X10*3/UL (ref 0–0.1)
BASOPHILS NFR BLD AUTO: 0.4 %
BILIRUB SERPL-MCNC: 0.3 MG/DL (ref 0–1.2)
BUN SERPL-MCNC: 14 MG/DL (ref 6–23)
CALCIUM SERPL-MCNC: 9.1 MG/DL (ref 8.6–10.3)
CARDIAC TROPONIN I PNL SERPL HS: <3 NG/L (ref 0–13)
CHLORIDE SERPL-SCNC: 110 MMOL/L (ref 98–107)
CO2 SERPL-SCNC: 26 MMOL/L (ref 21–32)
CREAT SERPL-MCNC: 0.72 MG/DL (ref 0.5–1.05)
EGFRCR SERPLBLD CKD-EPI 2021: >90 ML/MIN/1.73M*2
EOSINOPHIL # BLD AUTO: 0.14 X10*3/UL (ref 0–0.7)
EOSINOPHIL NFR BLD AUTO: 1.9 %
ERYTHROCYTE [DISTWIDTH] IN BLOOD BY AUTOMATED COUNT: 12.7 % (ref 11.5–14.5)
GLUCOSE SERPL-MCNC: 96 MG/DL (ref 74–99)
HCT VFR BLD AUTO: 35.8 % (ref 36–46)
HGB BLD-MCNC: 12.2 G/DL (ref 12–16)
IMM GRANULOCYTES # BLD AUTO: 0.02 X10*3/UL (ref 0–0.7)
IMM GRANULOCYTES NFR BLD AUTO: 0.3 % (ref 0–0.9)
LYMPHOCYTES # BLD AUTO: 2.53 X10*3/UL (ref 1.2–4.8)
LYMPHOCYTES NFR BLD AUTO: 34.2 %
MAGNESIUM SERPL-MCNC: 1.9 MG/DL (ref 1.6–2.4)
MCH RBC QN AUTO: 34.4 PG (ref 26–34)
MCHC RBC AUTO-ENTMCNC: 34.1 G/DL (ref 32–36)
MCV RBC AUTO: 101 FL (ref 80–100)
MONOCYTES # BLD AUTO: 0.55 X10*3/UL (ref 0.1–1)
MONOCYTES NFR BLD AUTO: 7.4 %
NEUTROPHILS # BLD AUTO: 4.12 X10*3/UL (ref 1.2–7.7)
NEUTROPHILS NFR BLD AUTO: 55.8 %
NRBC BLD-RTO: 0 /100 WBCS (ref 0–0)
PLATELET # BLD AUTO: 269 X10*3/UL (ref 150–450)
POTASSIUM SERPL-SCNC: 4.1 MMOL/L (ref 3.5–5.3)
PROT SERPL-MCNC: 6.8 G/DL (ref 6.4–8.2)
RBC # BLD AUTO: 3.55 X10*6/UL (ref 4–5.2)
SODIUM SERPL-SCNC: 140 MMOL/L (ref 136–145)
WBC # BLD AUTO: 7.4 X10*3/UL (ref 4.4–11.3)

## 2025-08-26 PROCEDURE — 71046 X-RAY EXAM CHEST 2 VIEWS: CPT

## 2025-08-26 PROCEDURE — 85025 COMPLETE CBC W/AUTO DIFF WBC: CPT | Performed by: PHYSICIAN ASSISTANT

## 2025-08-26 PROCEDURE — 93005 ELECTROCARDIOGRAM TRACING: CPT

## 2025-08-26 PROCEDURE — 84484 ASSAY OF TROPONIN QUANT: CPT | Performed by: PHYSICIAN ASSISTANT

## 2025-08-26 PROCEDURE — 84702 CHORIONIC GONADOTROPIN TEST: CPT | Performed by: PHYSICIAN ASSISTANT

## 2025-08-26 PROCEDURE — 36415 COLL VENOUS BLD VENIPUNCTURE: CPT | Performed by: PHYSICIAN ASSISTANT

## 2025-08-26 PROCEDURE — 99285 EMERGENCY DEPT VISIT HI MDM: CPT | Mod: 25 | Performed by: EMERGENCY MEDICINE

## 2025-08-26 PROCEDURE — 71046 X-RAY EXAM CHEST 2 VIEWS: CPT | Performed by: STUDENT IN AN ORGANIZED HEALTH CARE EDUCATION/TRAINING PROGRAM

## 2025-08-26 PROCEDURE — 84075 ASSAY ALKALINE PHOSPHATASE: CPT | Performed by: PHYSICIAN ASSISTANT

## 2025-08-26 PROCEDURE — 83735 ASSAY OF MAGNESIUM: CPT | Performed by: PHYSICIAN ASSISTANT

## 2025-08-26 ASSESSMENT — PAIN DESCRIPTION - DIRECTION: RADIATING_TOWARDS: LEFT ARM

## 2025-08-26 ASSESSMENT — PAIN DESCRIPTION - DESCRIPTORS: DESCRIPTORS: ACHING;TIGHTNESS

## 2025-08-26 ASSESSMENT — HEART SCORE
RISK FACTORS: 1-2 RISK FACTORS
ECG: NORMAL
HEART SCORE: 1
AGE: <45
TROPONIN: LESS THAN OR EQUAL TO NORMAL LIMIT
HISTORY: SLIGHTLY SUSPICIOUS

## 2025-08-26 ASSESSMENT — PAIN DESCRIPTION - LOCATION: LOCATION: CHEST

## 2025-08-26 ASSESSMENT — PAIN - FUNCTIONAL ASSESSMENT: PAIN_FUNCTIONAL_ASSESSMENT: 0-10

## 2025-08-26 ASSESSMENT — PAIN DESCRIPTION - ORIENTATION: ORIENTATION: MID

## 2025-08-26 ASSESSMENT — PAIN SCALES - GENERAL: PAINLEVEL_OUTOF10: 6

## 2025-08-26 ASSESSMENT — PAIN DESCRIPTION - FREQUENCY: FREQUENCY: CONSTANT/CONTINUOUS

## 2025-08-26 ASSESSMENT — PAIN DESCRIPTION - PAIN TYPE: TYPE: ACUTE PAIN

## 2025-08-27 ENCOUNTER — APPOINTMENT (OUTPATIENT)
Dept: CARDIOLOGY | Facility: HOSPITAL | Age: 25
End: 2025-08-27
Payer: COMMERCIAL

## 2025-08-27 VITALS
RESPIRATION RATE: 18 BRPM | DIASTOLIC BLOOD PRESSURE: 72 MMHG | OXYGEN SATURATION: 100 % | SYSTOLIC BLOOD PRESSURE: 106 MMHG | HEART RATE: 64 BPM | TEMPERATURE: 98.1 F | BODY MASS INDEX: 33.04 KG/M2 | WEIGHT: 175 LBS | HEIGHT: 61 IN

## 2025-08-27 LAB
ATRIAL RATE: 58 BPM
CARDIAC TROPONIN I PNL SERPL HS: <3 NG/L (ref 0–13)
P AXIS: 30 DEGREES
P OFFSET: 190 MS
P ONSET: 150 MS
PR INTERVAL: 146 MS
Q ONSET: 223 MS
QRS COUNT: 10 BEATS
QRS DURATION: 82 MS
QT INTERVAL: 412 MS
QTC CALCULATION(BAZETT): 404 MS
QTC FREDERICIA: 407 MS
R AXIS: 76 DEGREES
T AXIS: 53 DEGREES
T OFFSET: 429 MS
VENTRICULAR RATE: 58 BPM

## 2025-08-28 LAB
ATRIAL RATE: 56 BPM
P AXIS: 20 DEGREES
P OFFSET: 194 MS
P ONSET: 145 MS
PR INTERVAL: 160 MS
Q ONSET: 225 MS
QRS COUNT: 10 BEATS
QRS DURATION: 80 MS
QT INTERVAL: 426 MS
QTC CALCULATION(BAZETT): 411 MS
QTC FREDERICIA: 416 MS
R AXIS: 78 DEGREES
T AXIS: 61 DEGREES
T OFFSET: 438 MS
VENTRICULAR RATE: 56 BPM

## 2025-09-04 ENCOUNTER — APPOINTMENT (OUTPATIENT)
Dept: PRIMARY CARE | Facility: CLINIC | Age: 25
End: 2025-09-04
Payer: COMMERCIAL

## (undated) DEVICE — Device

## (undated) DEVICE — COLLECTION SYSTEM, GYN BERKELEY, SAFE TOUCH

## (undated) DEVICE — GLOVE, POLYISOPRENE, SZ 6.5, PF,LF

## (undated) DEVICE — BOWL, BASIN, 32 OZ, STERILE

## (undated) DEVICE — CURETTE, UTERINE, VAC, CRVD 7MM

## (undated) DEVICE — GOWN, SURGICAL, ROYAL SILK, LG, STERILE

## (undated) DEVICE — SUCTION TRAP, GYN BERKELEY, SAFE TOUCH

## (undated) DEVICE — HOSE,  BOTTLE TO BOTTLE, ASSEMBLY, DISP

## (undated) DEVICE — TRAY, DRY PREP, PREMIUM

## (undated) DEVICE — GOWN, SURGICAL, ROYAL SILK, XL, STERILE

## (undated) DEVICE — DISPOSA-FILTER

## (undated) DEVICE — STRAP, ARM BOARD, 32 X 1.5